# Patient Record
Sex: FEMALE | Race: WHITE | HISPANIC OR LATINO | URBAN - METROPOLITAN AREA
[De-identification: names, ages, dates, MRNs, and addresses within clinical notes are randomized per-mention and may not be internally consistent; named-entity substitution may affect disease eponyms.]

---

## 2019-11-16 ENCOUNTER — INPATIENT HOSPITAL (AMBULATORY)
Dept: URBAN - METROPOLITAN AREA HOSPITAL 76 | Facility: HOSPITAL | Age: 34
End: 2019-11-16

## 2019-11-16 DIAGNOSIS — R93.3 ABNORMAL FINDINGS ON DIAGNOSTIC IMAGING OF OTHER PARTS OF DI: ICD-10-CM

## 2019-11-16 DIAGNOSIS — K85.90 ACUTE PANCREATITIS WITHOUT NECROSIS OR INFECTION, UNSPECIFIE: ICD-10-CM

## 2019-11-16 PROCEDURE — 99252 IP/OBS CONSLTJ NEW/EST SF 35: CPT | Performed by: INTERNAL MEDICINE

## 2019-11-17 PROCEDURE — 99232 SBSQ HOSP IP/OBS MODERATE 35: CPT | Performed by: INTERNAL MEDICINE

## 2020-03-11 ENCOUNTER — TRANSCRIBE ORDERS (OUTPATIENT)
Dept: ADMINISTRATIVE | Facility: HOSPITAL | Age: 35
End: 2020-03-11

## 2020-03-11 DIAGNOSIS — N18.6 END STAGE RENAL DISEASE (HCC): Primary | ICD-10-CM

## 2020-04-08 ENCOUNTER — APPOINTMENT (OUTPATIENT)
Dept: CARDIOLOGY | Facility: HOSPITAL | Age: 35
End: 2020-04-08

## 2020-04-09 ENCOUNTER — APPOINTMENT (OUTPATIENT)
Dept: VASCULAR SURGERY | Facility: HOSPITAL | Age: 35
End: 2020-04-09

## 2020-04-14 ENCOUNTER — APPOINTMENT (OUTPATIENT)
Dept: CARDIOLOGY | Facility: HOSPITAL | Age: 35
End: 2020-04-14

## 2020-04-15 ENCOUNTER — HOSPITAL ENCOUNTER (OUTPATIENT)
Dept: CARDIOLOGY | Facility: HOSPITAL | Age: 35
Discharge: HOME | End: 2020-04-15
Admitting: SURGERY

## 2020-04-15 DIAGNOSIS — N18.6 END STAGE RENAL DISEASE (HCC): ICD-10-CM

## 2020-04-15 LAB
BH CV VAS MEAS BASILIC ANTECUBITAL FOSSA LEFT: 0.12 CM
BH CV VAS MEAS BASILIC ANTECUBITAL FOSSA RIGHT: 0.12 CM
BH CV VAS MEAS BASILIC FOREARM LEFT - DIST: 0.08 CM
BH CV VAS MEAS BASILIC FOREARM LEFT - MID: 0.11 CM
BH CV VAS MEAS BASILIC FOREARM RIGHT - DIST: 0.13 CM
BH CV VAS MEAS BASILIC FOREARM RIGHT - MID: 0.1 CM
BH CV VAS MEAS BASILIC FOREARM RIGHT - PROX: 0.14 CM
BH CV VAS MEAS BASILIC UPPER ARM LEFT - DIST: 0.12 CM
BH CV VAS MEAS BASILIC UPPER ARM LEFT - MID: 0.16 CM
BH CV VAS MEAS BASILIC UPPER ARM LEFT - PROX: 0.2 CM
BH CV VAS MEAS BASILIC UPPER ARM RIGHT - DIST: 0.22 CM
BH CV VAS MEAS BASILIC UPPER ARM RIGHT - MID: 0.23 CM
BH CV VAS MEAS BASILIC UPPER ARM RIGHT - PROX: 0.39 CM
BH CV VAS MEAS CEPHALIC ANTECUBITAL FOSSA LEFT: 0.37 CM
BH CV VAS MEAS CEPHALIC ANTECUBITAL FOSSA RIGHT: 0.45 CM
BH CV VAS MEAS CEPHALIC FOREARM LEFT - DIST: 0.09 CM
BH CV VAS MEAS CEPHALIC FOREARM LEFT - MID: 0.11 CM
BH CV VAS MEAS CEPHALIC FOREARM LEFT - PROX: 0.12 CM
BH CV VAS MEAS CEPHALIC FOREARM RIGHT - DIST: 0.09 CM
BH CV VAS MEAS CEPHALIC FOREARM RIGHT - MID: 0.15 CM
BH CV VAS MEAS CEPHALIC FOREARM RIGHT - PROX: 0.13 CM
BH CV VAS MEAS CEPHALIC UPPER ARM LEFT - DIST: 0.17 CM
BH CV VAS MEAS CEPHALIC UPPER ARM LEFT - MID: 0.21 CM
BH CV VAS MEAS CEPHALIC UPPER ARM LEFT - PROX: 0.17 CM
BH CV VAS MEAS CEPHALIC UPPER ARM RIGHT - DIST: 0.31 CM
BH CV VAS MEAS CEPHALIC UPPER ARM RIGHT - MID: 0.12 CM
BH CV VAS MEAS CEPHALIC UPPER ARM RIGHT - PROX: 0.17 CM
BH CV VAS MEAS RADIAL UPPER ARM LEFT - DIST: 0.15 CM
BH CV VAS MEAS RADIAL UPPER ARM LEFT - MID: 0.2 CM
BH CV VAS MEAS RADIAL UPPER ARM LEFT - PROX: 0.22 CM
BH CV VAS MEAS RADIAL UPPER ARM RIGHT - DIST: 0.19 CM
BH CV VAS MEAS RADIAL UPPER ARM RIGHT - MID: 0.18 CM
BH CV VAS MEAS RADIAL UPPER ARM RIGHT - PROX: 0.2 CM
UPPER ARTERIAL LEFT ARM BRACHIAL LENGTH: 0.31 CM
UPPER ARTERIAL RIGHT ARM BRACHIAL LENGTH: 0.38 CM

## 2020-04-15 PROCEDURE — 93986 DUP-SCAN HEMO COMPL UNI STD: CPT

## 2020-10-20 ENCOUNTER — TRANSCRIBE ORDERS (OUTPATIENT)
Dept: ADMINISTRATIVE | Facility: HOSPITAL | Age: 35
End: 2020-10-20

## 2020-10-20 DIAGNOSIS — N18.6 END STAGE RENAL DISEASE (HCC): Primary | ICD-10-CM

## 2020-10-21 ENCOUNTER — HOSPITAL ENCOUNTER (OUTPATIENT)
Dept: CARDIOLOGY | Facility: HOSPITAL | Age: 35
Discharge: HOME | End: 2020-10-21
Admitting: SURGERY

## 2020-10-21 DIAGNOSIS — N18.6 END STAGE RENAL DISEASE (HCC): ICD-10-CM

## 2020-10-21 LAB
BH CV VAS DIALYSIS ARTERIAL ANASTOMOSIS EDV: 136 CM/SEC
BH CV VAS DIALYSIS ARTERIAL ANASTOMOSIS PSV: 285 CM/SEC
BH CV VAS DIALYSIS CONDUIT DIST DEPTH: 1.61 CM
BH CV VAS DIALYSIS CONDUIT DIST DIAMETER: 0.96 CM
BH CV VAS DIALYSIS CONDUIT DIST EDV: 74 CM/SEC
BH CV VAS DIALYSIS CONDUIT DIST FLOW VOL: 1325 ML/MIN
BH CV VAS DIALYSIS CONDUIT DIST PSV: 140 CM/SEC
BH CV VAS DIALYSIS CONDUIT MID DEPTH: 1 CM
BH CV VAS DIALYSIS CONDUIT MID DIAMETER: 0.46 CM
BH CV VAS DIALYSIS CONDUIT MID EDV: 491 CM/SEC
BH CV VAS DIALYSIS CONDUIT MID FLOW VOL: 1537 ML/MIN
BH CV VAS DIALYSIS CONDUIT MID PSV: 802 CM/SEC
BH CV VAS DIALYSIS CONDUIT MID/DIST DEPTH: 1 CM
BH CV VAS DIALYSIS CONDUIT MID/DIST DIAMETER: 1.21 CM
BH CV VAS DIALYSIS CONDUIT MID/DIST EDV: 78 CM/SEC
BH CV VAS DIALYSIS CONDUIT MID/DIST FLOW VOL: 2065 ML/MIN
BH CV VAS DIALYSIS CONDUIT MID/DIST PSV: 146 CM/SEC
BH CV VAS DIALYSIS CONDUIT PROX DEPTH: 0.37 CM
BH CV VAS DIALYSIS CONDUIT PROX DIAMETER: 0.43 CM
BH CV VAS DIALYSIS CONDUIT PROX EDV: 296 CM/SEC
BH CV VAS DIALYSIS CONDUIT PROX FLOW VOL: 1085 ML/MIN
BH CV VAS DIALYSIS CONDUIT PROX PSV: 509 CM/SEC
BH CV VAS DIALYSIS CONDUIT PROX/MID DEPTH: 0.65 CM
BH CV VAS DIALYSIS CONDUIT PROX/MID DIAMETER: 0.69 CM
BH CV VAS DIALYSIS CONDUIT PROX/MID EDV: 102 CM/SEC
BH CV VAS DIALYSIS CONDUIT PROX/MID FLOW VOL: 915 ML/MIN
BH CV VAS DIALYSIS CONDUIT PROX/MID PSV: 207 CM/SEC
BH CV VAS DIALYSIS LEFT BRANCH 1 DIAMETER: 0.35 CM
BH CV VAS DIALYSIS PRE-INFLOW BRACHIAL DIAMETER: 0.67 CM
BH CV VAS DIALYSIS PRE-INFLOW BRACHIAL EDV: 64 CM/SEC
BH CV VAS DIALYSIS PRE-INFLOW BRACHIAL FLOW VOL: 842 ML/MIN
BH CV VAS DIALYSIS PRE-INFLOW BRACHIAL PSV: 119 CM/SEC
BH CV VAS DIALYSIS VENOUS OUTFLOW AXILLARY DIAMETER: 1.08 CM
BH CV VAS DIALYSIS VENOUS OUTFLOW AXILLARY EDV: 13 CM/SEC
BH CV VAS DIALYSIS VENOUS OUTFLOW AXILLARY PSV: 38 CM/SEC

## 2020-10-21 PROCEDURE — 93990 DOPPLER FLOW TESTING: CPT

## 2020-10-22 ENCOUNTER — APPOINTMENT (OUTPATIENT)
Dept: VASCULAR SURGERY | Facility: HOSPITAL | Age: 35
End: 2020-10-22

## 2020-10-22 PROCEDURE — G0463 HOSPITAL OUTPT CLINIC VISIT: HCPCS

## 2020-11-09 RX ORDER — CLONIDINE HYDROCHLORIDE 0.3 MG/1
0.3 TABLET ORAL 2 TIMES DAILY
COMMUNITY

## 2020-11-09 RX ORDER — LABETALOL 300 MG/1
300 TABLET, FILM COATED ORAL 2 TIMES DAILY
COMMUNITY

## 2020-11-09 RX ORDER — BUMETANIDE 2 MG/1
2 TABLET ORAL DAILY
COMMUNITY

## 2020-11-09 RX ORDER — FUROSEMIDE 20 MG/1
20 TABLET ORAL DAILY
COMMUNITY

## 2020-11-09 RX ORDER — MINOXIDIL 2.5 MG/1
2.5 TABLET ORAL 2 TIMES DAILY
COMMUNITY

## 2020-11-09 RX ORDER — INSULIN ASPART 100 [IU]/ML
17 INJECTION, SUSPENSION SUBCUTANEOUS 2 TIMES DAILY WITH MEALS
COMMUNITY

## 2020-11-09 RX ORDER — HYDRALAZINE HYDROCHLORIDE 100 MG/1
100 TABLET, FILM COATED ORAL 3 TIMES DAILY
COMMUNITY

## 2020-11-11 ENCOUNTER — HOSPITAL ENCOUNTER (OUTPATIENT)
Dept: CARDIOLOGY | Facility: HOSPITAL | Age: 35
Discharge: HOME | End: 2020-11-11

## 2020-11-11 ENCOUNTER — LAB (OUTPATIENT)
Dept: LAB | Facility: HOSPITAL | Age: 35
End: 2020-11-11

## 2020-11-11 PROCEDURE — U0004 COV-19 TEST NON-CDC HGH THRU: HCPCS

## 2020-11-11 PROCEDURE — 93005 ELECTROCARDIOGRAM TRACING: CPT | Performed by: SURGERY

## 2020-11-11 PROCEDURE — 93010 ELECTROCARDIOGRAM REPORT: CPT | Performed by: INTERNAL MEDICINE

## 2020-11-11 PROCEDURE — C9803 HOPD COVID-19 SPEC COLLECT: HCPCS

## 2020-11-12 ENCOUNTER — ANESTHESIA EVENT (OUTPATIENT)
Dept: PERIOP | Facility: HOSPITAL | Age: 35
End: 2020-11-12

## 2020-11-12 LAB — SARS-COV-2 RNA RESP QL NAA+PROBE: NOT DETECTED

## 2020-11-13 ENCOUNTER — HOSPITAL ENCOUNTER (OUTPATIENT)
Facility: HOSPITAL | Age: 35
Setting detail: HOSPITAL OUTPATIENT SURGERY
Discharge: HOME | End: 2020-11-13
Attending: SURGERY | Admitting: SURGERY

## 2020-11-13 ENCOUNTER — ANESTHESIA (OUTPATIENT)
Dept: PERIOP | Facility: HOSPITAL | Age: 35
End: 2020-11-13

## 2020-11-13 VITALS
OXYGEN SATURATION: 97 % | SYSTOLIC BLOOD PRESSURE: 168 MMHG | TEMPERATURE: 98.2 F | WEIGHT: 143.21 LBS | DIASTOLIC BLOOD PRESSURE: 58 MMHG | HEART RATE: 65 BPM | HEIGHT: 61 IN | BODY MASS INDEX: 27.04 KG/M2 | RESPIRATION RATE: 16 BRPM

## 2020-11-13 DIAGNOSIS — N18.6 ESRD (END STAGE RENAL DISEASE) ON DIALYSIS (HCC): Primary | ICD-10-CM

## 2020-11-13 DIAGNOSIS — Z99.2 ESRD (END STAGE RENAL DISEASE) ON DIALYSIS (HCC): Primary | ICD-10-CM

## 2020-11-13 LAB
ANION GAP SERPL CALCULATED.3IONS-SCNC: 13 MMOL/L (ref 5–15)
APTT PPP: 27 SECONDS (ref 24–31)
B-HCG UR QL: NEGATIVE
BUN SERPL-MCNC: 40 MG/DL (ref 6–20)
BUN/CREAT SERPL: 9.2 (ref 7–25)
CALCIUM SPEC-SCNC: 8.3 MG/DL (ref 8.6–10.5)
CHLORIDE SERPL-SCNC: 97 MMOL/L (ref 98–107)
CO2 SERPL-SCNC: 27 MMOL/L (ref 22–29)
CREAT SERPL-MCNC: 4.33 MG/DL (ref 0.57–1)
DEPRECATED RDW RBC AUTO: 47.3 FL (ref 37–54)
ERYTHROCYTE [DISTWIDTH] IN BLOOD BY AUTOMATED COUNT: 14.3 % (ref 12.3–15.4)
GFR SERPL CREATININE-BSD FRML MDRD: 12 ML/MIN/1.73
GFR SERPL CREATININE-BSD FRML MDRD: 14 ML/MIN/1.73
GLUCOSE BLDC GLUCOMTR-MCNC: 176 MG/DL (ref 70–105)
GLUCOSE SERPL-MCNC: 199 MG/DL (ref 65–99)
HCT VFR BLD AUTO: 38.2 % (ref 34–46.6)
HGB BLD-MCNC: 12.6 G/DL (ref 12–15.9)
INR PPP: 1.06 (ref 0.93–1.1)
MCH RBC QN AUTO: 30.8 PG (ref 26.6–33)
MCHC RBC AUTO-ENTMCNC: 32.9 G/DL (ref 31.5–35.7)
MCV RBC AUTO: 93.5 FL (ref 79–97)
PLATELET # BLD AUTO: 187 10*3/MM3 (ref 140–450)
PMV BLD AUTO: 7.5 FL (ref 6–12)
POTASSIUM SERPL-SCNC: 4 MMOL/L (ref 3.5–5.2)
PROTHROMBIN TIME: 11.6 SECONDS (ref 9.6–11.7)
RBC # BLD AUTO: 4.08 10*6/MM3 (ref 3.77–5.28)
SODIUM SERPL-SCNC: 137 MMOL/L (ref 136–145)
WBC # BLD AUTO: 5 10*3/MM3 (ref 3.4–10.8)

## 2020-11-13 PROCEDURE — 80048 BASIC METABOLIC PNL TOTAL CA: CPT | Performed by: SURGERY

## 2020-11-13 PROCEDURE — 25010000002 HEPARIN (PORCINE) PER 1000 UNITS: Performed by: NURSE ANESTHETIST, CERTIFIED REGISTERED

## 2020-11-13 PROCEDURE — 82962 GLUCOSE BLOOD TEST: CPT

## 2020-11-13 PROCEDURE — 25010000002 HEPARIN (PORCINE) PER 1000 UNITS: Performed by: SURGERY

## 2020-11-13 PROCEDURE — 76942 ECHO GUIDE FOR BIOPSY: CPT | Performed by: SURGERY

## 2020-11-13 PROCEDURE — 25010000003 LIDOCAINE 1 % SOLUTION 20 ML VIAL: Performed by: SURGERY

## 2020-11-13 PROCEDURE — 85610 PROTHROMBIN TIME: CPT | Performed by: SURGERY

## 2020-11-13 PROCEDURE — 85027 COMPLETE CBC AUTOMATED: CPT | Performed by: SURGERY

## 2020-11-13 PROCEDURE — 25010000002 PROPOFOL 10 MG/ML EMULSION: Performed by: NURSE ANESTHETIST, CERTIFIED REGISTERED

## 2020-11-13 PROCEDURE — C1725 CATH, TRANSLUMIN NON-LASER: HCPCS | Performed by: SURGERY

## 2020-11-13 PROCEDURE — 25010000002 PROTAMINE SULFATE PER 10 MG: Performed by: NURSE ANESTHETIST, CERTIFIED REGISTERED

## 2020-11-13 PROCEDURE — 25010000002 MEPIVACAINE PF 1 % SOLUTION: Performed by: ANESTHESIOLOGY

## 2020-11-13 PROCEDURE — 25010000003 CEFAZOLIN PER 500 MG: Performed by: SURGERY

## 2020-11-13 PROCEDURE — 25010000002 MIDAZOLAM PER 1 MG: Performed by: NURSE ANESTHETIST, CERTIFIED REGISTERED

## 2020-11-13 PROCEDURE — 25010000002 HYDRALAZINE PER 20 MG: Performed by: NURSE ANESTHETIST, CERTIFIED REGISTERED

## 2020-11-13 PROCEDURE — 81025 URINE PREGNANCY TEST: CPT | Performed by: SURGERY

## 2020-11-13 PROCEDURE — 25010000002 FENTANYL CITRATE (PF) 100 MCG/2ML SOLUTION: Performed by: NURSE ANESTHETIST, CERTIFIED REGISTERED

## 2020-11-13 PROCEDURE — 25010000002 ROPIVACAINE PER 1 MG: Performed by: ANESTHESIOLOGY

## 2020-11-13 PROCEDURE — 85730 THROMBOPLASTIN TIME PARTIAL: CPT | Performed by: SURGERY

## 2020-11-13 DEVICE — LIGACLIP MCA MULTIPLE CLIP APPLIERS, 20 SMALL CLIPS
Type: IMPLANTABLE DEVICE | Site: ARM | Status: FUNCTIONAL
Brand: LIGACLIP

## 2020-11-13 DEVICE — SEAL HEMO SURG ARISTA/AH ABS/PWDR 3GM: Type: IMPLANTABLE DEVICE | Site: ARM | Status: FUNCTIONAL

## 2020-11-13 DEVICE — FLOSEAL HEMOSTATIC MATRIX, 5ML
Type: IMPLANTABLE DEVICE | Site: ARM | Status: FUNCTIONAL
Brand: FLOSEAL HEMOSTATIC MATRIX

## 2020-11-13 RX ORDER — FENTANYL CITRATE 50 UG/ML
INJECTION, SOLUTION INTRAMUSCULAR; INTRAVENOUS AS NEEDED
Status: DISCONTINUED | OUTPATIENT
Start: 2020-11-13 | End: 2020-11-13 | Stop reason: SURG

## 2020-11-13 RX ORDER — HYDRALAZINE HYDROCHLORIDE 20 MG/ML
INJECTION INTRAMUSCULAR; INTRAVENOUS AS NEEDED
Status: DISCONTINUED | OUTPATIENT
Start: 2020-11-13 | End: 2020-11-13 | Stop reason: SURG

## 2020-11-13 RX ORDER — PROTAMINE SULFATE 10 MG/ML
INJECTION, SOLUTION INTRAVENOUS AS NEEDED
Status: DISCONTINUED | OUTPATIENT
Start: 2020-11-13 | End: 2020-11-13 | Stop reason: SURG

## 2020-11-13 RX ORDER — HEPARIN SODIUM 1000 [USP'U]/ML
INJECTION, SOLUTION INTRAVENOUS; SUBCUTANEOUS AS NEEDED
Status: DISCONTINUED | OUTPATIENT
Start: 2020-11-13 | End: 2020-11-13 | Stop reason: SURG

## 2020-11-13 RX ORDER — ROPIVACAINE HYDROCHLORIDE 5 MG/ML
INJECTION, SOLUTION EPIDURAL; INFILTRATION; PERINEURAL
Status: COMPLETED | OUTPATIENT
Start: 2020-11-13 | End: 2020-11-13

## 2020-11-13 RX ORDER — ONDANSETRON 2 MG/ML
4 INJECTION INTRAMUSCULAR; INTRAVENOUS ONCE AS NEEDED
Status: DISCONTINUED | OUTPATIENT
Start: 2020-11-13 | End: 2020-11-13 | Stop reason: HOSPADM

## 2020-11-13 RX ORDER — SODIUM CHLORIDE 9 MG/ML
20 INJECTION, SOLUTION INTRAVENOUS CONTINUOUS
Status: DISCONTINUED | OUTPATIENT
Start: 2020-11-13 | End: 2020-11-13 | Stop reason: HOSPADM

## 2020-11-13 RX ORDER — NALOXONE HCL 0.4 MG/ML
0.4 VIAL (ML) INJECTION AS NEEDED
Status: DISCONTINUED | OUTPATIENT
Start: 2020-11-13 | End: 2020-11-13 | Stop reason: HOSPADM

## 2020-11-13 RX ORDER — LABETALOL HYDROCHLORIDE 5 MG/ML
5 INJECTION, SOLUTION INTRAVENOUS
Status: DISCONTINUED | OUTPATIENT
Start: 2020-11-13 | End: 2020-11-13 | Stop reason: HOSPADM

## 2020-11-13 RX ORDER — HYDROMORPHONE HCL 110MG/55ML
0.2 PATIENT CONTROLLED ANALGESIA SYRINGE INTRAVENOUS
Status: DISCONTINUED | OUTPATIENT
Start: 2020-11-13 | End: 2020-11-13 | Stop reason: HOSPADM

## 2020-11-13 RX ORDER — MIDAZOLAM HYDROCHLORIDE 1 MG/ML
INJECTION INTRAMUSCULAR; INTRAVENOUS AS NEEDED
Status: DISCONTINUED | OUTPATIENT
Start: 2020-11-13 | End: 2020-11-13 | Stop reason: SURG

## 2020-11-13 RX ORDER — HYDROMORPHONE HCL 110MG/55ML
0.5 PATIENT CONTROLLED ANALGESIA SYRINGE INTRAVENOUS
Status: DISCONTINUED | OUTPATIENT
Start: 2020-11-13 | End: 2020-11-13 | Stop reason: HOSPADM

## 2020-11-13 RX ORDER — HYDROCODONE BITARTRATE AND ACETAMINOPHEN 5; 325 MG/1; MG/1
1 TABLET ORAL EVERY 8 HOURS PRN
Qty: 20 TABLET | Refills: 0 | Status: SHIPPED | OUTPATIENT
Start: 2020-11-13

## 2020-11-13 RX ADMIN — FENTANYL CITRATE 25 MCG: 50 INJECTION, SOLUTION INTRAMUSCULAR; INTRAVENOUS at 08:05

## 2020-11-13 RX ADMIN — MEPIVACAINE HYDROCHLORIDE 15 ML: 10 INJECTION, SOLUTION EPIDURAL; INFILTRATION at 07:16

## 2020-11-13 RX ADMIN — CEFAZOLIN SODIUM 2 G: 1 INJECTION, POWDER, FOR SOLUTION INTRAMUSCULAR; INTRAVENOUS at 07:52

## 2020-11-13 RX ADMIN — HYDRALAZINE HYDROCHLORIDE 5 MG: 20 INJECTION INTRAMUSCULAR; INTRAVENOUS at 08:10

## 2020-11-13 RX ADMIN — ROPIVACAINE HYDROCHLORIDE 15 ML: 5 INJECTION, SOLUTION EPIDURAL; INFILTRATION; PERINEURAL at 07:16

## 2020-11-13 RX ADMIN — PROPOFOL 125 MCG/KG/MIN: 10 INJECTION, EMULSION INTRAVENOUS at 07:49

## 2020-11-13 RX ADMIN — SODIUM CHLORIDE 20 ML/HR: 9 INJECTION, SOLUTION INTRAVENOUS at 06:36

## 2020-11-13 RX ADMIN — PROTAMINE SULFATE 40 MG: 10 INJECTION, SOLUTION INTRAVENOUS at 09:08

## 2020-11-13 RX ADMIN — MIDAZOLAM 2 MG: 1 INJECTION INTRAMUSCULAR; INTRAVENOUS at 07:49

## 2020-11-13 RX ADMIN — HEPARIN SODIUM 7500 UNITS: 1000 INJECTION, SOLUTION INTRAVENOUS; SUBCUTANEOUS at 08:42

## 2020-11-13 NOTE — ANESTHESIA PREPROCEDURE EVALUATION
Anesthesia Evaluation     Patient summary reviewed and Nursing notes reviewed   no history of anesthetic complications:  NPO Solid Status: > 8 hours  NPO Liquid Status: > 8 hours           Airway   Dental      Pulmonary    Cardiovascular     ECG reviewed  Patient on routine beta blocker    (+) hypertension,       Neuro/Psych  GI/Hepatic/Renal/Endo    (+)   renal disease ESRD and dialysis, diabetes mellitus,     Musculoskeletal     Abdominal    Substance History      OB/GYN          Other        ROS/Med Hx Other:     PSH  ARTERIOVENOUS FISTULA                  Anesthesia Plan    ASA 4     regional   (Patient identified; pre-operative vital signs, all relevant labs/studies, complete medical/surgical/anesthetic history, full medication list, full allergy list, and NPO status obtained/reviewed; physical assessment performed; anesthetic options, side effects, potential complications, risks, and benefits discussed; questions answered; written anesthesia consent obtained; patient cleared for procedure; anesthesia machine and equipment checked and functioning)    Anesthetic plan, all risks, benefits, and alternatives have been provided, discussed and informed consent has been obtained with: patient.    Plan discussed with CRNA and CAA.

## 2020-11-13 NOTE — ANESTHESIA POSTPROCEDURE EVALUATION
Patient: Geno Crouch    Procedure Summary     Date: 11/13/20 Room / Location: [facility] OR 10 / [facility] MAIN OR    Anesthesia Start: 0746 Anesthesia Stop: 0939    Procedure: LEFT ARM FISTULA TRANSPOSITION (Left Arm Upper) Diagnosis:       End stage renal disease (CMS/HCC)      (End stage renal disease (CMS/HCC) [N18.6])    Surgeon: Arnel Douglas MD Provider: Efraín Gutiérrez MD    Anesthesia Type: regional ASA Status: 4          Anesthesia Type: regional    Vitals  Vitals Value Taken Time   /72 11/13/20 0958   Temp     Pulse 63 11/13/20 1001   Resp 11 11/13/20 0956   SpO2 100 % 11/13/20 1001   Vitals shown include unvalidated device data.        Post Anesthesia Care and Evaluation    Patient location during evaluation: PACU  Patient participation: complete - patient participated  Level of consciousness: awake  Pain scale: See nurse's notes for pain score.  Pain management: adequate  Airway patency: patent  Anesthetic complications: No anesthetic complications  PONV Status: none  Cardiovascular status: acceptable  Respiratory status: acceptable  Hydration status: acceptable    Comments: Patient seen and examined postoperatively; vital signs stable; SpO2 greater than or equal to 90%; cardiopulmonary status stable; nausea/vomiting adequately controlled; pain adequately controlled; no apparent anesthesia complications; patient discharged from anesthesia care when discharge criteria were met

## 2020-11-13 NOTE — DISCHARGE INSTRUCTIONS
[facility]  Santosh Mallory, Gene Suarez Rachel, [place]  [address], [address]  [phone]    Post-Operative Instructions for AV Fistula / Graft   Diet: Regular Diet    Medications: Take your regularly scheduled medications on the day of your surgery, unless your doctor has directed you otherwise. You may be sent home with a prescription for pain medication, follow the directions as prescribed.    Activity Restrictions / Driving: Avoid lifting more than 15 pounds or other activities that stress or compress the access area. No driving for the remainder of the day after surgery. You may drive when you no longer are taking narcotic pain medications. If a nerve block was done to numb your arm for surgery, you will be placed in an arm sling.  This numbness and inability to move the arm can last for as little as 6 hours but as many as 18.  The sling should be used during this time but can be removed when sensation and movement of your arm is normal and does not need to be used after that. Use of the arm is encouraged after the surgery.    Incision Care: Some bruising is normal. If you have drainage from the incision please notify the office. Dressing should be removed in 48 hours. After dressing is removed, it is OK to shower. Do not submerge incision until cleared by your surgeon (bath or swimming).    Bathing and Showering: You may shower after you remove your dressing.    Follow-up Appointments: You will need to return to the office for a follow-up visit within 1-3 weeks after your surgery. Please make sure you have your appointment scheduled, call [phone].    The patient (you) should:  1. Avoid wearing tight constrictive clothing over that arm.  2. Avoid wearing jewelry that is tight, such as a watch on the access arm.  3. Avoid carrying heavy objects.  4. Avoid purse straps over the fistula.  5. Avoid sleeping on the arm or keeping it bent for extended periods of time.  6.  "Each day, using your opposite hand, feel over the fistula for the \"thrill\" or vibration that is normally present.    Fistula Information / Care:  ·  It is normal to have swelling in the surgical area. To help control this swelling, you should elevate your arm on a pillow.  ·  Wiggle your fingers and clinch your fist 10 times every hour, while awake, for the first 5-7 days. Also, bend and straighten at the elbow to regain normal range of motion. These exercises are designed to promote circulation in the fingers and aid in draining away the excess fluid accumulation in the immediate area.  · No blood pressures or needle sticks in the arm with your access.    Call the office for the followin. Fever greater than 101.0  2. Uncontrolled pain. This is on a scale of 1-10 (10 being the worst pain imaginable) your pain is a level 7 or above.  3. It is important that you notify our office if you are having numbness and significant pain in the extremity in which you have just had surgery!  4. Decreased or absent thrill.  5. Nausea, diarrhea, and/or vomiting that continue for 12-24 hours.  6. Signs of an infection: redness, increased swelling, drainage, fever and/or chills.  7. Chest pain or difficulty breathing.    The fistula or graft CAN NOT be used until the MD has given written approval. Generally, a graft will be ready to use in 2 weeks, and a fistula will be ready to use in 6-8 weeks.     If you have further questions after reading this handout, the office is open from 8:30am to 5:00pm Monday through Friday. Call [phone].    "

## 2020-11-13 NOTE — ANESTHESIA PROCEDURE NOTES
Peripheral Block    Pre-sedation assessment completed: 11/13/2020 7:10 AM    Patient reassessed immediately prior to procedure    Patient location during procedure: pre-op  Start time: 11/13/2020 7:10 AM  Stop time: 11/13/2020 7:17 AM  Reason for block: at surgeon's request, post-op pain management and primary anesthetic  Performed by  Anesthesiologist: Sean Issa DO  Preanesthetic Checklist  Completed: patient identified, site marked, surgical consent, pre-op evaluation, timeout performed, IV checked, risks and benefits discussed and monitors and equipment checked  Prep:  Pt Position: supine  Sterile barriers:washed/disinfected hands, cap, gloves and mask  Prep: ChloraPrep  Patient monitoring: blood pressure monitoring, continuous pulse oximetry and EKG  Procedure  Sedation:no    Guidance:ultrasound guided  ULTRASOUND INTERPRETATION. Using ultrasound guidance a 22 G gauge needle was placed in close proximity to the nerve, at which point, under ultrasound guidance anesthetic was injected in the area of the nerve and spread of the anesthesia was seen on ultrasound in close proximity thereto.  There were no abnormalities seen on ultrasound; a digital image was taken; and the patient tolerated the procedure with no complications. Images:still images obtained, printed/placed on chart    Laterality:left  Block Type:supraclavicular  Injection Technique:single-shot  Needle Type:echogenic  Needle Gauge:22 G  Resistance on Injection: less than 15 psi    Medications Used: ropivacaine (NAROPIN) 0.5 % injection, 15 mL  mepivacaine PF (CARBOCAINE) 1 % injection, 15 mL  Med admintered at 11/13/2020 7:16 AM      Post Assessment  Injection Assessment: negative aspiration for heme, no paresthesia on injection and incremental injection  Patient Tolerance:comfortable throughout block  Complications:no

## 2020-11-13 NOTE — OP NOTE
Date of Admission:  11/13/2020  Today's Date:  11/13/20  Arnel Douglas MD  [facility]      Preoperative Diagnosis: End Stage Renal Disease    Postoperative Diagnosis: same as above    Procedure Performed: Transposition of previous created basilic vein fistula with new anastamosis    CPT:   19541    Surgeon: Arnel Douglas MD    Assistant:  Brigette RICHARDS , Provided critical assistance in exposure, retraction, and suction that overall decrease blood loss and operative time.    Anesthesia: MAC with regional    Staff:   Circulator: Shelbie Whitehead RN; Karolina Wilks RN  Scrub Person: Darlin Lockhart  Assistant: Brigette Montanez CSA    Estimated Blood Loss: minimal    Findings:     Vein quality:  Moderate   Artery quality:  Good   Post operative thrill quality:  Good   Post distal perfusion: Triphasic radial signal.    There was a mildly sclerotic short segment of the mid basilic vein that was able to be distended with manual hydraulic pressure.  Ultimately I did use a 7 mm balloon to ensure this was maximally dilated.  Otherwise standard transposed basilic vein fistula creation.    Implants:    Implant Name Type Inv. Item Serial No.  Lot No. LRB No. Used Action   CLIPAPPLR M/ ENDO LIGACLIP 9 3/8IN SM - [id number] Implant CLIPAPPLR M/ ENDO LIGACLIP 9 3/8IN   [employer]  DIV [employer] J [employer] J . Left 1 Implanted   SEAL HEMO ABS NIKKIE AH PWDR 3GM - [id number] Implant SEAL HEMO ABS NIKKIE AH PWDR 3GM  MED[employer] HEMOSTAT[employer] [id number] Left 1 Implanted   KT SEAL HEMOS ABS FLOSEAL MATRX FAST/PREP 5ML - [id number] Implant KT SEAL HEMOS ABS FLOSEAL MATRX FAST/PREP 5ML  [employer] [id number] Left 1 Implanted       Specimen: none    Complications: none    Dispo: to PACU    Indication for procedure: 34 y.o. female with renal failure status post first stage basilic vein fistula creation [place].  She comes in today for second stage basilic vein fistula creation.   I discussed with her the risk, benefits, and alternatives informed consent was obtained.    Description of procedure:   The patient was taken to the operating room and placed in the supine position.  The arm was extended on an arm board and then prepped and draped in the usual sterile fashion.  Preoperative antibiotics were given.  A full surgical timeout was done.                 Longitudinal incision was made overlying the basilic vein fistula.  Meticulous dissection was performed throughout the entirety of the fistula from its arterial anastomosis to its insertion into the axillary vein.  The fistula was circumferentially dissected and all branches ligated throughout its course.  Care was taken to identify and preserve and protect all branches of the median antebrachial cutaneous nerve.      Brachial artery was dissected just proximal to the antecubital fossa with a circumferential control.  Lateral tunnel was created with tunneling device.  Heparin was administered.  5 minutes was allowed to elapse.  Vein was marked.  Distal vein was transected and the fistula was flushed with heparin-containing solution.  The vein was pulled through tunnel.  Arterial clamps were placed on the brachial artery arteriotomy was made with 11 blade scalpel and extended with Sultana scissors vein was spatulated and a anastomosis was performed with a 6-0 Prolene stitch in a running manner.     Meticulous hemostasis was obtained.  Distal perfusion the hand was inspected and appeared to be adequate.  Small dose of protamine was given to reverse heparin.  Deep tissues were all closed using a 2-0 Vicryl suture and superficial subcutaneous tissue was closed using 3-0 Vicryl suture skin was closed using a 4-0 Vicryl suture in a septic manner.  Local anesthesia was injected throughout the surgical field.  Patient tolerated the procedure well and was taken to recovery room in stable condition.      Arnel Douglas MD  11/13/20    There are no  hospital problems to display for this patient.

## 2020-11-14 ENCOUNTER — HOSPITAL ENCOUNTER (EMERGENCY)
Facility: HOSPITAL | Age: 35
Discharge: HOME | End: 2020-11-15
Admitting: EMERGENCY MEDICINE

## 2020-11-14 DIAGNOSIS — L24.89 IRRITANT CONTACT DERMATITIS DUE TO OTHER AGENTS: Primary | ICD-10-CM

## 2020-11-14 PROCEDURE — 99283 EMERGENCY DEPT VISIT LOW MDM: CPT

## 2020-11-15 VITALS
RESPIRATION RATE: 20 BRPM | BODY MASS INDEX: 27.1 KG/M2 | SYSTOLIC BLOOD PRESSURE: 138 MMHG | HEIGHT: 62 IN | DIASTOLIC BLOOD PRESSURE: 74 MMHG | WEIGHT: 147.27 LBS | OXYGEN SATURATION: 99 % | HEART RATE: 71 BPM | TEMPERATURE: 97.9 F

## 2020-11-15 RX ORDER — DIAPER,BRIEF,INFANT-TODD,DISP
EACH MISCELLANEOUS EVERY 12 HOURS SCHEDULED
Status: DISCONTINUED | OUTPATIENT
Start: 2020-11-15 | End: 2020-11-15 | Stop reason: HOSPADM

## 2020-11-15 RX ADMIN — HYDROCORTISONE: 1 CREAM TOPICAL at 00:38

## 2020-11-15 NOTE — ED PROVIDER NOTES
Subjective   Patient is a 34-year-old female who presents with complaints of allergic reaction to adhesive bandage that was placed yesterday.  Patient states she did have Transposition of previous created basilic vein fistula with new anastamosis done by Dr Douglas due to end-stage renal disease.  Patient currently rates her pain a 5/10 severity describes as a burning type pain.  She denies any paresthesias numbness weakness of her upper extremity.  Patient states the pain did go up her arm on the left side of her breast earlier denies any currently.  Patient is a shortness of breath, cough, rhinorrhea nasal congestion, fever.       used: Yes        Review of Systems   Constitutional: Negative.    HENT: Negative.    Eyes: Negative for photophobia and visual disturbance.   Respiratory: Negative.    Gastrointestinal: Negative for abdominal distention, abdominal pain, constipation, diarrhea, nausea and vomiting.   Musculoskeletal: Negative for back pain, neck pain and neck stiffness.   Skin: Positive for rash and wound.   Neurological: Negative.        Past Medical History:   Diagnosis Date   • CKD (chronic kidney disease)    • Diabetes mellitus (CMS/[facility])    • Dialysis patient (CMS/[facility])    • Hypertension        No Known Allergies    Past Surgical History:   Procedure Laterality Date   • ARTERIOVENOUS FISTULA         No family history on file.    Social History     Socioeconomic History   • Marital status: Single     Spouse name: Not on file   • Number of children: Not on file   • Years of education: Not on file   • Highest education level: Not on file   Tobacco Use   • Smoking status: Never Smoker   Substance and Sexual Activity   • Alcohol use: Not Currently   • Drug use: Not Currently           Objective   Physical Exam  Vitals signs and nursing note reviewed.   Constitutional:       General: She is not in acute distress.     Appearance: She is well-developed. She is not ill-appearing,  "toxic-appearing or diaphoretic.   HENT:      Head: Normocephalic and atraumatic.      Mouth/Throat:      Mouth: Mucous membranes are moist.      Pharynx: Oropharynx is clear.   Eyes:      General: No scleral icterus.     Extraocular Movements: Extraocular movements intact.      Pupils: Pupils are equal, round, and reactive to light.   Neck:      Musculoskeletal: Normal range of motion and neck supple. No muscular tenderness.   Cardiovascular:      Rate and Rhythm: Normal rate and regular rhythm.      Pulses: Normal pulses.      Heart sounds: No murmur. No friction rub. No gallop.    Pulmonary:      Effort: Pulmonary effort is normal. No respiratory distress.      Breath sounds: Normal breath sounds. No stridor. No wheezing, rhonchi or rales.   Chest:      Chest wall: No tenderness.   Musculoskeletal:        Arms:    Skin:     General: Skin is warm.      Capillary Refill: Capillary refill takes less than 2 seconds.      Coloration: Skin is not cyanotic, jaundiced or pale.      Findings: No rash.             Comments: Palpable thrill at anastomosis adequate bruit on auscultation   Neurological:      General: No focal deficit present.      Mental Status: She is alert and oriented to person, place, and time.   Psychiatric:         Mood and Affect: Mood normal.         Behavior: Behavior normal.         Procedures           ED Course    /74 (BP Location: Right arm, Patient Position: Sitting)   Pulse 71   Temp 98 °F (36.7 °C) (Oral)   Resp 16   Ht 157.5 cm (62\")   Wt 66.8 kg (147 lb 4.3 oz)   LMP 10/17/2020 (Approximate)   SpO2 99%   Breastfeeding No   BMI 26.94 kg/m²   Medications   hydrocortisone 1 % cream ( Topical Given 11/15/20 0038)     Labs Reviewed - No data to display  No radiology results for the last day                                         MDM  Number of Diagnoses or Management Options  Allergic dermatitis:   Diagnosis management comments: Chart Review:  Comorbidity: CKD on dialysis diabetes " hypertension  Disposition/Treatment:  Appropriate PPE was worn during exam and throughout all encounters with the patient.  While in the ED patient was found to have allergic reaction to adhesive bandage from recent AV fistula surgery done yesterday.  The incision line showed no signs of infection there is no cellulitic changes of the arm.  Once the bandage was removed area was cleansed hydrocortisone was applied along with a nonadherent dressing patient was advised to follow-up with surgeon on Monday for further evaluation and wound recheck.  She voiced understanding discharge instructions along with signs and symptoms to return the ED.      Final diagnoses:   Irritant contact dermatitis due to other agents            Kendra Amaya PA  11/15/20 0044

## 2020-11-15 NOTE — DISCHARGE INSTRUCTIONS
You may apply hydrocortisone cream 1-2 times daily for the next 2 days.  Do not apply over staples.    Look for signs of infection including increased redness, swelling, purulent drainage, or fever.    Follow-up with your surgeon on Monday for wound recheck.    Follow-up with your primary care provider in 3-5 days.  If you do not have a primary care provider call [phone] for help in finding one, or you may follow up with [facility] at [phone].

## 2020-11-17 LAB — QT INTERVAL: 444 MS

## 2020-11-18 ENCOUNTER — APPOINTMENT (OUTPATIENT)
Dept: VASCULAR SURGERY | Facility: HOSPITAL | Age: 35
End: 2020-11-18

## 2020-11-18 PROCEDURE — G0463 HOSPITAL OUTPT CLINIC VISIT: HCPCS

## 2020-11-25 ENCOUNTER — APPOINTMENT (OUTPATIENT)
Dept: VASCULAR SURGERY | Facility: HOSPITAL | Age: 35
End: 2020-11-25

## 2020-11-25 PROCEDURE — G0463 HOSPITAL OUTPT CLINIC VISIT: HCPCS

## 2020-12-09 ENCOUNTER — OFFICE (AMBULATORY)
Dept: URBAN - METROPOLITAN AREA CLINIC 64 | Facility: CLINIC | Age: 35
End: 2020-12-09
Payer: MEDICAID

## 2020-12-09 VITALS — WEIGHT: 147 LBS | SYSTOLIC BLOOD PRESSURE: 179 MMHG | DIASTOLIC BLOOD PRESSURE: 102 MMHG | HEART RATE: 70 BPM

## 2020-12-09 DIAGNOSIS — K59.00 CONSTIPATION, UNSPECIFIED: ICD-10-CM

## 2020-12-09 DIAGNOSIS — K21.9 GASTRO-ESOPHAGEAL REFLUX DISEASE WITHOUT ESOPHAGITIS: ICD-10-CM

## 2020-12-09 DIAGNOSIS — R10.13 EPIGASTRIC PAIN: ICD-10-CM

## 2020-12-09 DIAGNOSIS — R11.0 NAUSEA: ICD-10-CM

## 2020-12-09 PROCEDURE — 99203 OFFICE O/P NEW LOW 30 MIN: CPT | Performed by: NURSE PRACTITIONER

## 2020-12-09 RX ORDER — PANTOPRAZOLE SODIUM 40 MG/1
40 TABLET, DELAYED RELEASE ORAL
Qty: 30 | Refills: 11 | Status: COMPLETED
Start: 2020-12-09 | End: 2023-09-05

## 2020-12-09 RX ORDER — ONDANSETRON HYDROCHLORIDE 4 MG/1
16 TABLET, FILM COATED ORAL
Qty: 40 | Refills: 6 | Status: COMPLETED
Start: 2020-12-09 | End: 2023-09-05

## 2020-12-16 ENCOUNTER — TRANSCRIBE ORDERS (OUTPATIENT)
Dept: ADMINISTRATIVE | Facility: HOSPITAL | Age: 35
End: 2020-12-16

## 2020-12-16 ENCOUNTER — APPOINTMENT (OUTPATIENT)
Dept: VASCULAR SURGERY | Facility: HOSPITAL | Age: 35
End: 2020-12-16

## 2020-12-16 DIAGNOSIS — N18.6 END STAGE RENAL DISEASE (HCC): ICD-10-CM

## 2020-12-16 DIAGNOSIS — Z99.2 ENCOUNTER FOR EXTRACORPOREAL DIALYSIS (HCC): Primary | ICD-10-CM

## 2020-12-16 PROCEDURE — G0463 HOSPITAL OUTPT CLINIC VISIT: HCPCS

## 2020-12-30 ENCOUNTER — HOSPITAL ENCOUNTER (OUTPATIENT)
Dept: CARDIOLOGY | Facility: HOSPITAL | Age: 35
Discharge: HOME | End: 2020-12-30
Admitting: PHYSICIAN ASSISTANT

## 2020-12-30 DIAGNOSIS — Z99.2 ENCOUNTER FOR EXTRACORPOREAL DIALYSIS (HCC): ICD-10-CM

## 2020-12-30 DIAGNOSIS — N18.6 END STAGE RENAL DISEASE (HCC): ICD-10-CM

## 2020-12-30 LAB
BH CV VAS DIALYSIS ARTERIAL ANASTOMOSIS EDV: 187 CM/SEC
BH CV VAS DIALYSIS ARTERIAL ANASTOMOSIS PSV: 352 CM/SEC
BH CV VAS DIALYSIS CONDUIT DIST DEPTH: 0.17 CM
BH CV VAS DIALYSIS CONDUIT DIST DIAMETER: 0.89 CM
BH CV VAS DIALYSIS CONDUIT DIST EDV: 266 CM/SEC
BH CV VAS DIALYSIS CONDUIT DIST FLOW VOL: 3839 ML/MIN
BH CV VAS DIALYSIS CONDUIT DIST PSV: 437 CM/SEC
BH CV VAS DIALYSIS CONDUIT MID DEPTH: 0.24 CM
BH CV VAS DIALYSIS CONDUIT MID DIAMETER: 0.93 CM
BH CV VAS DIALYSIS CONDUIT MID EDV: 141 CM/SEC
BH CV VAS DIALYSIS CONDUIT MID FLOW VOL: 2954 ML/MIN
BH CV VAS DIALYSIS CONDUIT MID PSV: 260 CM/SEC
BH CV VAS DIALYSIS CONDUIT PROX DEPTH: 0.17 CM
BH CV VAS DIALYSIS CONDUIT PROX DIAMETER: 0.69 CM
BH CV VAS DIALYSIS CONDUIT PROX EDV: 144 CM/SEC
BH CV VAS DIALYSIS CONDUIT PROX FLOW VOL: 1677 ML/MIN
BH CV VAS DIALYSIS CONDUIT PROX PSV: 254 CM/SEC
BH CV VAS DIALYSIS CONDUIT PROX/MID DEPTH: 0.22 CM
BH CV VAS DIALYSIS CONDUIT PROX/MID DIAMETER: 1.06 CM
BH CV VAS DIALYSIS CONDUIT PROX/MID EDV: 153 CM/SEC
BH CV VAS DIALYSIS CONDUIT PROX/MID FLOW VOL: 4142 ML/MIN
BH CV VAS DIALYSIS CONDUIT PROX/MID PSV: 273 CM/SEC
BH CV VAS DIALYSIS LEFT BRANCH 1 DIAMETER: 0.27 CM
BH CV VAS DIALYSIS PRE-INFLOW BRACHIAL DIAMETER: 0.67 CM
BH CV VAS DIALYSIS PRE-INFLOW BRACHIAL EDV: 94 CM/SEC
BH CV VAS DIALYSIS PRE-INFLOW BRACHIAL FLOW VOL: 1411 ML/MIN
BH CV VAS DIALYSIS PRE-INFLOW BRACHIAL PSV: 169 CM/SEC
BH CV VAS DIALYSIS VENOUS OUTFLOW AXILLARY DIAMETER: 1.11 CM
BH CV VAS DIALYSIS VENOUS OUTFLOW AXILLARY EDV: 31 CM/SEC
BH CV VAS DIALYSIS VENOUS OUTFLOW AXILLARY PSV: 61 CM/SEC
BH CV VAS DIALYSIS VENOUS OUTFLOW SUBCLAVIAN DIAMETER: 0.98 CM

## 2020-12-30 PROCEDURE — 93990 DOPPLER FLOW TESTING: CPT

## 2021-01-06 ENCOUNTER — APPOINTMENT (OUTPATIENT)
Dept: VASCULAR SURGERY | Facility: HOSPITAL | Age: 36
End: 2021-01-06

## 2021-01-06 PROCEDURE — G0463 HOSPITAL OUTPT CLINIC VISIT: HCPCS

## 2021-01-11 ENCOUNTER — TRANSCRIBE ORDERS (OUTPATIENT)
Dept: ADMINISTRATIVE | Facility: HOSPITAL | Age: 36
End: 2021-01-11

## 2021-01-11 DIAGNOSIS — N18.6 END STAGE RENAL DISEASE (HCC): ICD-10-CM

## 2021-01-11 DIAGNOSIS — Z99.2 ENCOUNTER FOR EXTRACORPOREAL DIALYSIS (HCC): Primary | ICD-10-CM

## 2021-02-01 ENCOUNTER — OFFICE (AMBULATORY)
Dept: URBAN - METROPOLITAN AREA PATHOLOGY 4 | Facility: PATHOLOGY | Age: 36
End: 2021-02-01
Payer: COMMERCIAL

## 2021-02-01 ENCOUNTER — ON CAMPUS - OUTPATIENT (AMBULATORY)
Dept: URBAN - METROPOLITAN AREA HOSPITAL 2 | Facility: HOSPITAL | Age: 36
End: 2021-02-01
Payer: MEDICAID

## 2021-02-01 ENCOUNTER — OFFICE (AMBULATORY)
Dept: URBAN - METROPOLITAN AREA PATHOLOGY 4 | Facility: PATHOLOGY | Age: 36
End: 2021-02-01
Payer: MEDICAID

## 2021-02-01 VITALS
SYSTOLIC BLOOD PRESSURE: 198 MMHG | HEART RATE: 65 BPM | HEART RATE: 68 BPM | SYSTOLIC BLOOD PRESSURE: 199 MMHG | RESPIRATION RATE: 16 BRPM | SYSTOLIC BLOOD PRESSURE: 202 MMHG | OXYGEN SATURATION: 99 % | DIASTOLIC BLOOD PRESSURE: 100 MMHG | RESPIRATION RATE: 15 BRPM | RESPIRATION RATE: 18 BRPM | OXYGEN SATURATION: 95 % | HEART RATE: 59 BPM | DIASTOLIC BLOOD PRESSURE: 106 MMHG | TEMPERATURE: 98 F | SYSTOLIC BLOOD PRESSURE: 201 MMHG | SYSTOLIC BLOOD PRESSURE: 193 MMHG | DIASTOLIC BLOOD PRESSURE: 101 MMHG | DIASTOLIC BLOOD PRESSURE: 104 MMHG | WEIGHT: 153 LBS | DIASTOLIC BLOOD PRESSURE: 110 MMHG | HEIGHT: 63 IN | HEART RATE: 66 BPM | HEART RATE: 62 BPM | OXYGEN SATURATION: 100 % | SYSTOLIC BLOOD PRESSURE: 203 MMHG | SYSTOLIC BLOOD PRESSURE: 186 MMHG | DIASTOLIC BLOOD PRESSURE: 116 MMHG

## 2021-02-01 DIAGNOSIS — K21.9 GASTRO-ESOPHAGEAL REFLUX DISEASE WITHOUT ESOPHAGITIS: ICD-10-CM

## 2021-02-01 DIAGNOSIS — K31.89 OTHER DISEASES OF STOMACH AND DUODENUM: ICD-10-CM

## 2021-02-01 DIAGNOSIS — K29.60 OTHER GASTRITIS WITHOUT BLEEDING: ICD-10-CM

## 2021-02-01 DIAGNOSIS — D12.3 BENIGN NEOPLASM OF TRANSVERSE COLON: ICD-10-CM

## 2021-02-01 DIAGNOSIS — D50.0 IRON DEFICIENCY ANEMIA SECONDARY TO BLOOD LOSS (CHRONIC): ICD-10-CM

## 2021-02-01 PROBLEM — K63.5 POLYP OF COLON: Status: ACTIVE | Noted: 2021-02-01

## 2021-02-01 LAB
GI HISTOLOGY: A. SELECT: (no result)
GI HISTOLOGY: B. UNSPECIFIED: (no result)
GI HISTOLOGY: PDF REPORT: (no result)

## 2021-02-01 PROCEDURE — 43239 EGD BIOPSY SINGLE/MULTIPLE: CPT | Performed by: INTERNAL MEDICINE

## 2021-02-01 PROCEDURE — 88305 TISSUE EXAM BY PATHOLOGIST: CPT | Mod: 26 | Performed by: INTERNAL MEDICINE

## 2021-02-01 PROCEDURE — 45385 COLONOSCOPY W/LESION REMOVAL: CPT | Performed by: INTERNAL MEDICINE

## 2021-02-01 RX ADMIN — LABETALOL HCL 10 MG: 200 TABLET, FILM COATED ORAL at 14:29

## 2021-02-01 RX ADMIN — Medication 10 MG: at 14:15

## 2021-02-01 NOTE — SERVICEHPINOTES
BRIANNE DE LA FUENTE  is a  35  female   who presents today for a  EGD-Colonoscopy   for   the indications listed below. The updated Patient Profile was reviewed prior to the procedure, in conjunction with the Physical Exam, including medical conditions, surgical procedures, medications, allergies, family history and social history. See Physical Exam time stamp below for date and time of HPI completion.Pre-operatively, I reviewed the indication(s) for the procedure, the risks of the procedure [including but not limited to: unexpected bleeding possibly requiring hospitalization and/or unplanned repeat procedures, perforation possibly requiring surgical treatment, missed lesions and complications of sedation/MAC (also explained by anesthesia staff)]. I have evaluated the patient for risks associated with the planned anesthesia and the procedure to be performed and find the patient an acceptable candidate for IV sedation.Multiple opportunities were provided for any questions or concerns, and all questions were answered satisfactorily before any anesthesia was administered. We will proceed with the planned procedure.BR

## 2021-02-05 ENCOUNTER — OFFICE (AMBULATORY)
Dept: URBAN - METROPOLITAN AREA CLINIC 64 | Facility: CLINIC | Age: 36
End: 2021-02-05
Payer: MEDICAID

## 2021-02-05 VITALS
HEART RATE: 65 BPM | HEIGHT: 63 IN | DIASTOLIC BLOOD PRESSURE: 94 MMHG | SYSTOLIC BLOOD PRESSURE: 157 MMHG | WEIGHT: 146 LBS

## 2021-02-05 DIAGNOSIS — D64.9 ANEMIA, UNSPECIFIED: ICD-10-CM

## 2021-02-05 DIAGNOSIS — R11.0 NAUSEA: ICD-10-CM

## 2021-02-05 DIAGNOSIS — K59.00 CONSTIPATION, UNSPECIFIED: ICD-10-CM

## 2021-02-05 DIAGNOSIS — R10.13 EPIGASTRIC PAIN: ICD-10-CM

## 2021-02-05 DIAGNOSIS — K21.9 GASTRO-ESOPHAGEAL REFLUX DISEASE WITHOUT ESOPHAGITIS: ICD-10-CM

## 2021-02-05 PROCEDURE — 99213 OFFICE O/P EST LOW 20 MIN: CPT | Performed by: NURSE PRACTITIONER

## 2021-02-08 ENCOUNTER — TRANSCRIBE ORDERS (OUTPATIENT)
Dept: ADMINISTRATIVE | Facility: HOSPITAL | Age: 36
End: 2021-02-08

## 2021-02-08 DIAGNOSIS — Z99.2 ENCOUNTER FOR EXTRACORPOREAL DIALYSIS (HCC): Primary | ICD-10-CM

## 2021-02-08 DIAGNOSIS — N18.6 END STAGE RENAL DISEASE (HCC): ICD-10-CM

## 2021-02-09 ENCOUNTER — HOSPITAL ENCOUNTER (OUTPATIENT)
Dept: CARDIOLOGY | Facility: HOSPITAL | Age: 36
Discharge: HOME | End: 2021-02-09
Admitting: PHYSICIAN ASSISTANT

## 2021-02-09 DIAGNOSIS — Z99.2 ENCOUNTER FOR EXTRACORPOREAL DIALYSIS (HCC): ICD-10-CM

## 2021-02-09 DIAGNOSIS — N18.6 END STAGE RENAL DISEASE (HCC): ICD-10-CM

## 2021-02-09 LAB
BH CV VAS DIALYSIS ARTERIAL ANASTOMOSIS DIAMETER: 0.34 CM
BH CV VAS DIALYSIS ARTERIAL ANASTOMOSIS EDV: 249 CM/SEC
BH CV VAS DIALYSIS ARTERIAL ANASTOMOSIS PSV: 523 CM/SEC
BH CV VAS DIALYSIS CONDUIT DIST DEPTH: 0.15 CM
BH CV VAS DIALYSIS CONDUIT DIST DIAMETER: 1.23 CM
BH CV VAS DIALYSIS CONDUIT DIST EDV: 56 CM/SEC
BH CV VAS DIALYSIS CONDUIT DIST FLOW VOL: 1438 ML/MIN
BH CV VAS DIALYSIS CONDUIT DIST PSV: 150 CM/SEC
BH CV VAS DIALYSIS CONDUIT MID DEPTH: 0.2 CM
BH CV VAS DIALYSIS CONDUIT MID DIAMETER: 1.29 CM
BH CV VAS DIALYSIS CONDUIT MID EDV: 56 CM/SEC
BH CV VAS DIALYSIS CONDUIT MID FLOW VOL: 355 ML/MIN
BH CV VAS DIALYSIS CONDUIT MID PSV: 154 CM/SEC
BH CV VAS DIALYSIS CONDUIT PROX DEPTH: 0.39 CM
BH CV VAS DIALYSIS CONDUIT PROX DIAMETER: 0.27 CM
BH CV VAS DIALYSIS CONDUIT PROX EDV: 300 CM/SEC
BH CV VAS DIALYSIS CONDUIT PROX FLOW VOL: 756 ML/MIN
BH CV VAS DIALYSIS CONDUIT PROX PSV: 574 CM/SEC
BH CV VAS DIALYSIS CONDUIT PROX/MID DEPTH: 0.28 CM
BH CV VAS DIALYSIS CONDUIT PROX/MID DIAMETER: 0.28 CM
BH CV VAS DIALYSIS CONDUIT PROX/MID EDV: 143 CM/SEC
BH CV VAS DIALYSIS CONDUIT PROX/MID FLOW VOL: 243 ML/MIN
BH CV VAS DIALYSIS CONDUIT PROX/MID PSV: 310 CM/SEC
BH CV VAS DIALYSIS PRE-INFLOW BRACHIAL DIAMETER: 0.67 CM
BH CV VAS DIALYSIS PRE-INFLOW BRACHIAL EDV: 60 CM/SEC
BH CV VAS DIALYSIS PRE-INFLOW BRACHIAL FLOW VOL: 861 ML/MIN
BH CV VAS DIALYSIS PRE-INFLOW BRACHIAL PSV: 123 CM/SEC
BH CV VAS DIALYSIS VENOUS OUTFLOW AXILLARY DIAMETER: 1.23 CM
BH CV VAS DIALYSIS VENOUS OUTFLOW AXILLARY EDV: 25 CM/SEC
BH CV VAS DIALYSIS VENOUS OUTFLOW AXILLARY PSV: 58 CM/SEC

## 2021-02-09 PROCEDURE — 93990 DOPPLER FLOW TESTING: CPT

## 2021-02-10 ENCOUNTER — APPOINTMENT (OUTPATIENT)
Dept: VASCULAR SURGERY | Facility: HOSPITAL | Age: 36
End: 2021-02-10

## 2021-02-10 ENCOUNTER — LAB (OUTPATIENT)
Dept: LAB | Facility: HOSPITAL | Age: 36
End: 2021-02-10

## 2021-02-10 LAB — SARS-COV-2 ORF1AB RESP QL NAA+PROBE: NOT DETECTED

## 2021-02-10 PROCEDURE — U0004 COV-19 TEST NON-CDC HGH THRU: HCPCS | Performed by: SURGERY

## 2021-02-10 PROCEDURE — G0463 HOSPITAL OUTPT CLINIC VISIT: HCPCS

## 2021-02-10 PROCEDURE — C9803 HOPD COVID-19 SPEC COLLECT: HCPCS | Performed by: SURGERY

## 2021-02-12 ENCOUNTER — HOSPITAL ENCOUNTER (OUTPATIENT)
Facility: HOSPITAL | Age: 36
Setting detail: HOSPITAL OUTPATIENT SURGERY
Discharge: HOME | End: 2021-02-12
Attending: SURGERY | Admitting: SURGERY

## 2021-02-12 VITALS
RESPIRATION RATE: 13 BRPM | SYSTOLIC BLOOD PRESSURE: 174 MMHG | WEIGHT: 150.13 LBS | OXYGEN SATURATION: 99 % | DIASTOLIC BLOOD PRESSURE: 83 MMHG | BODY MASS INDEX: 27.63 KG/M2 | TEMPERATURE: 98.4 F | HEIGHT: 62 IN | HEART RATE: 62 BPM

## 2021-02-12 PROBLEM — T82.858A AV FISTULA STENOSIS (HCC): Status: ACTIVE | Noted: 2021-02-12

## 2021-02-12 PROBLEM — I77.0 AV (ARTERIOVENOUS FISTULA): Chronic | Status: ACTIVE | Noted: 2021-02-12

## 2021-02-12 PROBLEM — N18.6 ESRD (END STAGE RENAL DISEASE): Chronic | Status: ACTIVE | Noted: 2021-02-12

## 2021-02-12 PROCEDURE — C1894 INTRO/SHEATH, NON-LASER: HCPCS | Performed by: SURGERY

## 2021-02-12 PROCEDURE — C1725 CATH, TRANSLUMIN NON-LASER: HCPCS | Performed by: SURGERY

## 2021-02-12 PROCEDURE — C1769 GUIDE WIRE: HCPCS | Performed by: SURGERY

## 2021-02-12 PROCEDURE — 0 IOPAMIDOL PER 1 ML: Performed by: SURGERY

## 2021-02-12 PROCEDURE — C1887 CATHETER, GUIDING: HCPCS | Performed by: SURGERY

## 2021-02-12 RX ORDER — LIDOCAINE HYDROCHLORIDE 20 MG/ML
INJECTION, SOLUTION INFILTRATION; PERINEURAL AS NEEDED
Status: DISCONTINUED | OUTPATIENT
Start: 2021-02-12 | End: 2021-02-12 | Stop reason: HOSPADM

## 2021-02-12 NOTE — OP NOTE
Operative Note  [facility]: [facility]  Date of Admission: 2/12/2021  OR Date: 2/12/2021    Pre-op Diagnosis:  1.  AV fistula dysfunction  2.  End-stage renal disease on hemodialysis    Post-op Diagnosis:  1.  AV fistula dysfunction  2.  End-stage renal disease on hemodialysis    Procedure:   Left brachiobasilic AV fistulogram with anastomotic balloon angioplasty to 5 mm and juxta anastomotic balloon angioplasty to 8 mm    Surgeon: Miguel Angel Basurto MD    Anesthesia: Local    Estimated Blood Loss: Minimal    Specimen: None    Complications: None    Findings: Possible anastomotic stenosis.  Irregularity and stenosis of the juxta anastomotic and proximal AV fistula (in the distal arm).  Marked improvement in AV fistula thrill following balloon dilatation of the anastomosis to 5 mm.  Additional improvement in AV fistula thrill amplitude following dilation of the proximal fistula to 8 mm.  No left central vein stenosis.    Indications: 35-year-old woman with chronic kidney disease maintained on hemodialysis.  Undergoing that creation of a left brachiobasilic AV fistula.  Initial fistula scan demonstrated the fistula to be widely patent with excellent volume flows ranging 2-4 L/min. Recent scan demonstrated a failing fistula with volume flows less than 600 mL/min, with associated anastomotic and juxta anastomotic stenoses.  Submits now for fistulogram.       Procedure:  The patient was brought to the Cath Lab and positioned supine the operating table.  The lead shield was placed around her pelvis in a circumferential fashion.  The left upper extremity was prepared with ChloraPrep and draped in a sterile fashion.  Using palpation, the fistula in the mid to proximal arm was cannulated with a micropuncture kit and micropuncture catheter was placed.  A Glidewire was advanced through the anastomosis into the distal brachial artery and a 5 Romansh sheath was placed.  The guide cath was then passed through the anastomosis and  positioned in the distal brachial artery.  Contrast injection demonstrated marked irregularity and 70% stenosis of the basilic vein in the distal arm, proximal with respect to the fistula.  Since the duplex scan had demonstrated an anastomotic stenosis, I began there.  The wire was replaced and the catheter removed.  A 5 x 20 mm balloon was inflated in the anastomosis, and an angiographic waste was eliminated.  After a 1 minute inflation to 15 saritha, the balloon was deflated and removed.  There was already a marked improvement in the amplitude of the thrill.  The catheter was replaced, and a follow-up study demonstrated improvement.  There was still stenosis in the basilic vein, and I was not certain I could reach that from where I cannulated the fistula.  The 5 Mauritanian sheath was removed and a bolster suture was placed.  Under local anesthesia, I cannulated the fistula in the proximal arm, very near the deltoid muscle with a micropuncture kit and the micropuncture kit was exchanged for a 6 Mauritanian sheath.  An 8 x 60 mm balloon was brought onto the field and the basilic vein stenosis in the distal arm was treated with balloon angioplasty to 8 mm for 1 minute.  Following deflation and removal of the balloon, a follow-up contrast study demonstrated residual stenosis of about 20%, but there was incremental improvement in the amplitude of the thrill following the second inflation as well.  The result was accepted.  Subsequent contrast studies were obtained through the sheath, demonstrating the proximal basilic vein in the central veins to be widely patent.  A second bolster stitch was applied at the puncture site and the 6 Mauritanian sheath was removed.  A sterile dressing was applied  At its conclusion the patient had tolerated the procedure well, and without apparent complications.  Sponge and needle counts were correct.  The patient was taken to the recovery area in stable condition.    Angiographic findings:  The distal  brachial artery is normal in appearance.  The anastomosis is patent but small.  There was a mild distal basilic vein stenosis approximately 3 to 5 cm off the anastomosis, with more significant irregularity and stenosis in the mid to distal arm.  The remainder of the fistula to the right atrium is widely patent.  Following balloon angioplasty of the anastomosis to 5 mm, there was marked improvement in the basilic vein thrill, but the appearance of the anastomosis appeared about the same, perhaps related to the tortuosity.  Following angioplasty of the distal basilic vein, there is marked improvement, with perhaps 20% residual stenosis.  The amplitude of the thrill was excellent.  There were no central vein stenoses.    Miguel Angel Basurto MD     Date: 2/12/2021  Time: 09:01 EST

## 2021-02-12 NOTE — NURSING NOTE
Patient given discharge instructions with the use of the   IPad. Patient instructed that she is okay for dialysis for tomorrow and they will remove her dressing. Patient instructed to take instructions with her to dialysis. Patient was educate on not lifting objects with her left arm for the day. She was instructed to follow up with Vascular in 2 weeks.

## 2021-02-12 NOTE — H&P
[facility]   HISTORY AND PHYSICAL    Patient Name: Geno Crouch  : 1985  MRN: [id number]  Primary Care Physician: Kristi Multani APRN  Date of admission: 2021    Subjective   Subjective     Chief Complaint: AV fistula dysfunction    35-year-old woman with chronic kidney disease maintained on hemodialysis.  Underwent creation of a left brachiobasilic AV fistula.  Presented for evaluation of reduced flow and question about prolonged bleeding from AV fistula.  Initial fistula scan demonstrated excellent performance of the fistula, with volume flows greater than 2 L/min and no stenoses.  Recent fistula scan shows a failing fistula with marked decreased in volume flow with anastomotic and juxta anastomotic stenosis, possibly related to intimal hyperplasia.  Submits now for AV fistulogram with possible angioplasty.  Patient comfortable, with no complaints at the present time.    Review of Systems   Constitutional: Negative.    HENT: Negative.    Eyes: Negative.    Respiratory: Negative.    Cardiovascular: Negative.       Personal History     Past Medical History:   Diagnosis Date   • CKD (chronic kidney disease)    • Diabetes mellitus (CMS/[facility])    • Dialysis patient (CMS/[facility])    • Hypertension        Past Surgical History:   Procedure Laterality Date   • ARTERIOVENOUS FISTULA     • ARTERIOVENOUS FISTULA/SHUNT SURGERY Left 2020    Procedure: LEFT ARM FISTULA TRANSPOSITION;  Surgeon: Arnel Douglas MD;  Location: [facility];  Service: Vascular;  Laterality: Left;       Family History: family history is not on file. Otherwise pertinent FHx was reviewed and not pertinent to current issue.    Social History:  reports that she has never smoked. She does not have any smokeless tobacco history on file. She reports previous alcohol use. She reports previous drug use.    Home Medications:  HYDROcodone-acetaminophen, bumetanide, cloNIDine, furosemide, hydrALAZINE, insulin aspart prot-insulin  aspart, labetalol, and minoxidil    Allergies:  No Known Allergies    Objective    Objective     Vitals:  Temp:  [98.4 °F (36.9 °C)] 98.4 °F (36.9 °C)  Heart Rate:  [62] 62  Resp:  [13] 13  BP: (152)/(80) 152/80    Physical Exam  Constitutional:       General: She is not in acute distress.     Appearance: Normal appearance. She is normal weight. She is not ill-appearing, toxic-appearing or diaphoretic.   HENT:      Head: Normocephalic and atraumatic.      Right Ear: External ear normal.      Left Ear: External ear normal.      Nose:      Comments: Patient wearing mask     Mouth/Throat:      Comments: Patient wearing mask  Eyes:      Extraocular Movements: Extraocular movements intact.      Pupils: Pupils are equal, round, and reactive to light.   Neck:      Musculoskeletal: Normal range of motion and neck supple.      Vascular: No carotid bruit.   Cardiovascular:      Rate and Rhythm: Normal rate and regular rhythm.      Comments: Thrill and bruit present in the left brachiobasilic AV fistula.  No hematoma or pseudoaneurysm suspected.  No arm swelling.  Pulmonary:      Effort: Pulmonary effort is normal. No respiratory distress.      Breath sounds: Normal breath sounds.   Abdominal:      General: Abdomen is flat.      Palpations: Abdomen is soft.      Tenderness: There is no abdominal tenderness.   Musculoskeletal: Normal range of motion.   Skin:     General: Skin is warm and dry.      Capillary Refill: Capillary refill takes less than 2 seconds.   Neurological:      General: No focal deficit present.      Mental Status: She is alert and oriented to person, place, and time.      Cranial Nerves: No cranial nerve deficit.   Psychiatric:         Mood and Affect: Mood normal.         Behavior: Behavior normal.         Thought Content: Thought content normal.         Judgment: Judgment normal.     Result Review    Result Review:  I have personally reviewed the results from the time of this admission to 02/12/21 7:26 AM  EST and agree with these findings:  []  Laboratory  []  Microbiology  [x]  Radiology  []  EKG/Telemetry   []  Cardiology/Vascular   []  Pathology  []  Old records  []  Other:  Most notable findings include: AV fistula scan 2/9/2021 demonstrating anastomotic and juxtaanastomotic stenosis with decreased volume flow.    Assessment/Plan   Assessment / Plan     Brief Patient Summary:  Geno Crouch is a 35 y.o. female with chronic kidney disease maintained on hemodialysis with a failing left brachiobasilic AV fistula.  Presents for AV fistulogram possible angioplasty.    Active Hospital Problems:  Active Hospital Problems    Diagnosis   • **AV fistula stenosis (CMS/HCC)   • ESRD (end stage renal disease) (CMS/HCC)   • AV (arteriovenous fistula) (CMS/[facility])     Plan: Left brachiocephalic AV fistulogram with possible angioplasty.    Electronically signed by Miguel Angel Basurto MD, 02/12/21, 7:26 AM EST.

## 2021-04-08 ENCOUNTER — APPOINTMENT (OUTPATIENT)
Dept: VASCULAR SURGERY | Facility: HOSPITAL | Age: 36
End: 2021-04-08

## 2021-04-08 ENCOUNTER — APPOINTMENT (OUTPATIENT)
Dept: CARDIOLOGY | Facility: HOSPITAL | Age: 36
End: 2021-04-08

## 2021-04-15 ENCOUNTER — HOSPITAL ENCOUNTER (OUTPATIENT)
Dept: CARDIOLOGY | Facility: HOSPITAL | Age: 36
Discharge: HOME | End: 2021-04-15

## 2021-04-15 ENCOUNTER — APPOINTMENT (OUTPATIENT)
Dept: VASCULAR SURGERY | Facility: HOSPITAL | Age: 36
End: 2021-04-15

## 2021-04-15 DIAGNOSIS — Z99.2 ENCOUNTER FOR EXTRACORPOREAL DIALYSIS (HCC): ICD-10-CM

## 2021-04-15 DIAGNOSIS — N18.6 END STAGE RENAL DISEASE (HCC): ICD-10-CM

## 2021-04-15 LAB
BH CV VAS DIALYSIS ARTERIAL ANASTOMOSIS DIAMETER: 0.56 CM
BH CV VAS DIALYSIS ARTERIAL ANASTOMOSIS EDV: 241 CM/SEC
BH CV VAS DIALYSIS ARTERIAL ANASTOMOSIS PSV: 468 CM/SEC
BH CV VAS DIALYSIS CONDUIT DIST DEPTH: 0.31 CM
BH CV VAS DIALYSIS CONDUIT DIST DIAMETER: 1.32 CM
BH CV VAS DIALYSIS CONDUIT DIST EDV: 32 CM/SEC
BH CV VAS DIALYSIS CONDUIT DIST FLOW VOL: 2053 ML/MIN
BH CV VAS DIALYSIS CONDUIT DIST PSV: 65 CM/SEC
BH CV VAS DIALYSIS CONDUIT MID DEPTH: 0.21 CM
BH CV VAS DIALYSIS CONDUIT MID DIAMETER: 1.2 CM
BH CV VAS DIALYSIS CONDUIT MID EDV: 60 CM/SEC
BH CV VAS DIALYSIS CONDUIT MID FLOW VOL: 2113 ML/MIN
BH CV VAS DIALYSIS CONDUIT MID PSV: 123 CM/SEC
BH CV VAS DIALYSIS CONDUIT PROX DEPTH: 0.38 CM
BH CV VAS DIALYSIS CONDUIT PROX DIAMETER: 0.41 CM
BH CV VAS DIALYSIS CONDUIT PROX EDV: 351 CM/SEC
BH CV VAS DIALYSIS CONDUIT PROX FLOW VOL: 1361 ML/MIN
BH CV VAS DIALYSIS CONDUIT PROX PSV: 626 CM/SEC
BH CV VAS DIALYSIS PRE-INFLOW BRACHIAL DIAMETER: 0.7 CM
BH CV VAS DIALYSIS PRE-INFLOW BRACHIAL EDV: 96 CM/SEC
BH CV VAS DIALYSIS PRE-INFLOW BRACHIAL FLOW VOL: 1796 ML/MIN
BH CV VAS DIALYSIS PRE-INFLOW BRACHIAL PSV: 184 CM/SEC
BH CV VAS DIALYSIS VENOUS OUTFLOW AXILLARY DIAMETER: 1.48 CM
BH CV VAS DIALYSIS VENOUS OUTFLOW AXILLARY EDV: 45 CM/SEC
BH CV VAS DIALYSIS VENOUS OUTFLOW AXILLARY PSV: 105 CM/SEC
BH CV VAS DIALYSIS VENOUS OUTFLOW SUBCLAVIAN DIAMETER: 1.28 CM
BH CV VAS DIALYSIS VENOUS OUTFLOW SUBCLAVIAN EDV: 47 CM/SEC
BH CV VAS DIALYSIS VENOUS OUTFLOW SUBCLAVIAN PSV: 115 CM/SEC

## 2021-04-15 PROCEDURE — 93990 DOPPLER FLOW TESTING: CPT

## 2021-04-15 PROCEDURE — G0463 HOSPITAL OUTPT CLINIC VISIT: HCPCS

## 2021-06-17 ENCOUNTER — APPOINTMENT (OUTPATIENT)
Dept: VASCULAR SURGERY | Facility: HOSPITAL | Age: 36
End: 2021-06-17

## 2021-06-17 ENCOUNTER — TRANSCRIBE ORDERS (OUTPATIENT)
Dept: ADMINISTRATIVE | Facility: HOSPITAL | Age: 36
End: 2021-06-17

## 2021-06-17 DIAGNOSIS — N18.6 END STAGE RENAL DISEASE (HCC): ICD-10-CM

## 2021-06-17 DIAGNOSIS — Z99.2 ENCOUNTER FOR EXTRACORPOREAL DIALYSIS (HCC): Primary | ICD-10-CM

## 2021-06-22 ENCOUNTER — HOSPITAL ENCOUNTER (OUTPATIENT)
Dept: CARDIOLOGY | Facility: HOSPITAL | Age: 36
Discharge: HOME | End: 2021-06-22
Admitting: PHYSICIAN ASSISTANT

## 2021-06-22 DIAGNOSIS — Z99.2 ENCOUNTER FOR EXTRACORPOREAL DIALYSIS (HCC): ICD-10-CM

## 2021-06-22 DIAGNOSIS — N18.6 END STAGE RENAL DISEASE (HCC): ICD-10-CM

## 2021-06-22 LAB
BH CV VAS DIALYSIS ARTERIAL ANASTOMOSIS EDV: 346 CM/SEC
BH CV VAS DIALYSIS ARTERIAL ANASTOMOSIS PSV: 639 CM/SEC
BH CV VAS DIALYSIS CONDUIT DIST DEPTH: 0.25 CM
BH CV VAS DIALYSIS CONDUIT DIST DIAMETER: 1.58 CM
BH CV VAS DIALYSIS CONDUIT DIST EDV: 34 CM/SEC
BH CV VAS DIALYSIS CONDUIT DIST FLOW VOL: 1807 ML/MIN
BH CV VAS DIALYSIS CONDUIT DIST PSV: 75 CM/SEC
BH CV VAS DIALYSIS CONDUIT MID DEPTH: 0.18 CM
BH CV VAS DIALYSIS CONDUIT MID DIAMETER: 1.15 CM
BH CV VAS DIALYSIS CONDUIT MID EDV: 106 CM/SEC
BH CV VAS DIALYSIS CONDUIT MID FLOW VOL: 2648 ML/MIN
BH CV VAS DIALYSIS CONDUIT MID PSV: 220 CM/SEC
BH CV VAS DIALYSIS CONDUIT PROX DEPTH: 0.42 CM
BH CV VAS DIALYSIS CONDUIT PROX DIAMETER: 0.78 CM
BH CV VAS DIALYSIS CONDUIT PROX EDV: 227 CM/SEC
BH CV VAS DIALYSIS CONDUIT PROX FLOW VOL: 2550 ML/MIN
BH CV VAS DIALYSIS CONDUIT PROX PSV: 470 CM/SEC
BH CV VAS DIALYSIS PRE-INFLOW BRACHIAL DIAMETER: 0.7 CM
BH CV VAS DIALYSIS PRE-INFLOW BRACHIAL EDV: 112 CM/SEC
BH CV VAS DIALYSIS PRE-INFLOW BRACHIAL FLOW VOL: 1986 ML/MIN
BH CV VAS DIALYSIS PRE-INFLOW BRACHIAL PSV: 199 CM/SEC
BH CV VAS DIALYSIS VENOUS OUTFLOW SUBCLAVIAN DIAMETER: 1.31 CM
BH CV VAS DIALYSIS VENOUS OUTFLOW SUBCLAVIAN EDV: 126 CM/SEC
BH CV VAS DIALYSIS VENOUS OUTFLOW SUBCLAVIAN PSV: 263 CM/SEC
MAXIMAL PREDICTED HEART RATE: 183 BPM
STRESS TARGET HR: 156 BPM

## 2021-06-22 PROCEDURE — 93990 DOPPLER FLOW TESTING: CPT

## 2021-06-24 ENCOUNTER — OFFICE VISIT (OUTPATIENT)
Dept: VASCULAR SURGERY | Facility: HOSPITAL | Age: 36
End: 2021-06-24

## 2021-06-24 PROCEDURE — G0463 HOSPITAL OUTPT CLINIC VISIT: HCPCS

## 2021-06-28 ENCOUNTER — TRANSCRIBE ORDERS (OUTPATIENT)
Dept: ADMINISTRATIVE | Facility: HOSPITAL | Age: 36
End: 2021-06-28

## 2021-06-28 DIAGNOSIS — N18.6 END STAGE RENAL DISEASE (HCC): ICD-10-CM

## 2021-06-28 DIAGNOSIS — Z99.2 ENCOUNTER FOR EXTRACORPOREAL DIALYSIS (HCC): Primary | ICD-10-CM

## 2021-09-30 ENCOUNTER — APPOINTMENT (OUTPATIENT)
Dept: CARDIOLOGY | Facility: HOSPITAL | Age: 36
End: 2021-09-30

## 2021-09-30 ENCOUNTER — APPOINTMENT (OUTPATIENT)
Dept: VASCULAR SURGERY | Facility: HOSPITAL | Age: 36
End: 2021-09-30

## 2021-11-03 ENCOUNTER — HOSPITAL ENCOUNTER (OUTPATIENT)
Dept: CARDIOLOGY | Facility: HOSPITAL | Age: 36
Discharge: HOME | End: 2021-11-03

## 2021-11-03 ENCOUNTER — APPOINTMENT (OUTPATIENT)
Dept: VASCULAR SURGERY | Facility: HOSPITAL | Age: 36
End: 2021-11-03

## 2021-11-03 DIAGNOSIS — N18.6 END STAGE RENAL DISEASE (HCC): ICD-10-CM

## 2021-11-03 DIAGNOSIS — Z99.2 ENCOUNTER FOR EXTRACORPOREAL DIALYSIS (HCC): ICD-10-CM

## 2021-11-03 LAB
BH CV VAS DIALYSIS ARTERIAL ANASTOMOSIS EDV: 223 CM/SEC
BH CV VAS DIALYSIS ARTERIAL ANASTOMOSIS PSV: 468 CM/SEC
BH CV VAS DIALYSIS CONDUIT DIST DEPTH: 0.18 CM
BH CV VAS DIALYSIS CONDUIT DIST DIAMETER: 1.4 CM
BH CV VAS DIALYSIS CONDUIT DIST EDV: 33 CM/SEC
BH CV VAS DIALYSIS CONDUIT DIST FLOW VOL: 1551 ML/MIN
BH CV VAS DIALYSIS CONDUIT DIST PSV: 65 CM/SEC
BH CV VAS DIALYSIS CONDUIT MID DEPTH: 0.4 CM
BH CV VAS DIALYSIS CONDUIT MID DIAMETER: 0.91 CM
BH CV VAS DIALYSIS CONDUIT MID EDV: 132 CM/SEC
BH CV VAS DIALYSIS CONDUIT MID FLOW VOL: 3353 ML/MIN
BH CV VAS DIALYSIS CONDUIT MID PSV: 296 CM/SEC
BH CV VAS DIALYSIS CONDUIT PROX DEPTH: 0.3 CM
BH CV VAS DIALYSIS CONDUIT PROX DIAMETER: 0.66 CM
BH CV VAS DIALYSIS CONDUIT PROX EDV: 273 CM/SEC
BH CV VAS DIALYSIS CONDUIT PROX FLOW VOL: 2053 ML/MIN
BH CV VAS DIALYSIS CONDUIT PROX PSV: 507 CM/SEC
BH CV VAS DIALYSIS PRE-INFLOW BRACHIAL DIAMETER: 0.76 CM
BH CV VAS DIALYSIS PRE-INFLOW BRACHIAL EDV: 55 CM/SEC
BH CV VAS DIALYSIS PRE-INFLOW BRACHIAL FLOW VOL: 1268 ML/MIN
BH CV VAS DIALYSIS PRE-INFLOW BRACHIAL PSV: 129 CM/SEC
BH CV VAS DIALYSIS VENOUS OUTFLOW AXILLARY EDV: 22 CM/SEC
BH CV VAS DIALYSIS VENOUS OUTFLOW AXILLARY PSV: 43 CM/SEC
MAXIMAL PREDICTED HEART RATE: 183 BPM
STRESS TARGET HR: 156 BPM

## 2021-11-03 PROCEDURE — 93990 DOPPLER FLOW TESTING: CPT

## 2021-11-03 PROCEDURE — G0463 HOSPITAL OUTPT CLINIC VISIT: HCPCS

## 2022-04-13 ENCOUNTER — TRANSCRIBE ORDERS (OUTPATIENT)
Dept: ADMINISTRATIVE | Facility: HOSPITAL | Age: 37
End: 2022-04-13

## 2022-04-13 DIAGNOSIS — Z99.2 ENCOUNTER FOR EXTRACORPOREAL DIALYSIS: ICD-10-CM

## 2022-04-13 DIAGNOSIS — N18.6 END STAGE RENAL DISEASE: Primary | ICD-10-CM

## 2022-11-10 ENCOUNTER — HOSPITAL ENCOUNTER (OUTPATIENT)
Dept: CARDIOLOGY | Facility: HOSPITAL | Age: 37
Discharge: HOME | End: 2022-11-10

## 2022-11-10 ENCOUNTER — APPOINTMENT (OUTPATIENT)
Dept: VASCULAR SURGERY | Facility: HOSPITAL | Age: 37
End: 2022-11-10

## 2022-11-10 DIAGNOSIS — N18.6 END STAGE RENAL DISEASE: ICD-10-CM

## 2022-11-10 DIAGNOSIS — Z99.2 ENCOUNTER FOR EXTRACORPOREAL DIALYSIS: ICD-10-CM

## 2022-11-10 LAB
BH CV VAS DIALYSIS ARTERIAL ANASTOMOSIS EDV: 204 CM/SEC
BH CV VAS DIALYSIS ARTERIAL ANASTOMOSIS PSV: 543 CM/SEC
BH CV VAS DIALYSIS CONDUIT DIST DEPTH: 0.2 CM
BH CV VAS DIALYSIS CONDUIT DIST DIAMETER: 1.18 CM
BH CV VAS DIALYSIS CONDUIT DIST EDV: 46 CM/SEC
BH CV VAS DIALYSIS CONDUIT DIST FLOW VOL: 1996 ML/MIN
BH CV VAS DIALYSIS CONDUIT DIST PSV: 76 CM/SEC
BH CV VAS DIALYSIS CONDUIT MID DEPTH: 0.22 CM
BH CV VAS DIALYSIS CONDUIT MID DIAMETER: 1.93 CM
BH CV VAS DIALYSIS CONDUIT MID EDV: 76 CM/SEC
BH CV VAS DIALYSIS CONDUIT MID FLOW VOL: 2385 ML/MIN
BH CV VAS DIALYSIS CONDUIT MID PSV: 201 CM/SEC
BH CV VAS DIALYSIS CONDUIT MID/DIST DEPTH: 0.3 CM
BH CV VAS DIALYSIS CONDUIT MID/DIST DIAMETER: 1.45 CM
BH CV VAS DIALYSIS CONDUIT MID/DIST EDV: 57 CM/SEC
BH CV VAS DIALYSIS CONDUIT MID/DIST FLOW VOL: 3314 ML/MIN
BH CV VAS DIALYSIS CONDUIT MID/DIST PSV: 147 CM/SEC
BH CV VAS DIALYSIS CONDUIT PROX DEPTH: 0.18 CM
BH CV VAS DIALYSIS CONDUIT PROX DIAMETER: 0.48 CM
BH CV VAS DIALYSIS CONDUIT PROX EDV: 336 CM/SEC
BH CV VAS DIALYSIS CONDUIT PROX FLOW VOL: 1719 ML/MIN
BH CV VAS DIALYSIS CONDUIT PROX PSV: 609 CM/SEC
BH CV VAS DIALYSIS CONDUIT PROX/MID DEPTH: 0.23 CM
BH CV VAS DIALYSIS CONDUIT PROX/MID DIAMETER: 2.2 CM
BH CV VAS DIALYSIS CONDUIT PROX/MID EDV: 45 CM/SEC
BH CV VAS DIALYSIS CONDUIT PROX/MID FLOW VOL: 1177 ML/MIN
BH CV VAS DIALYSIS CONDUIT PROX/MID PSV: 329 CM/SEC
BH CV VAS DIALYSIS PRE-INFLOW BRACHIAL DIAMETER: 0.89 CM
BH CV VAS DIALYSIS PRE-INFLOW BRACHIAL EDV: 51 CM/SEC
BH CV VAS DIALYSIS PRE-INFLOW BRACHIAL FLOW VOL: 1373 ML/MIN
BH CV VAS DIALYSIS PRE-INFLOW BRACHIAL PSV: 129 CM/SEC
BH CV VAS DIALYSIS VENOUS OUTFLOW AXILLARY DIAMETER: 1.7 CM
BH CV VAS DIALYSIS VENOUS OUTFLOW AXILLARY EDV: 67 CM/SEC
BH CV VAS DIALYSIS VENOUS OUTFLOW AXILLARY PSV: 90 CM/SEC
MAXIMAL PREDICTED HEART RATE: 182 BPM
STRESS TARGET HR: 155 BPM

## 2022-11-10 PROCEDURE — 93990 DOPPLER FLOW TESTING: CPT

## 2022-11-10 PROCEDURE — G0463 HOSPITAL OUTPT CLINIC VISIT: HCPCS

## 2022-11-25 ENCOUNTER — TRANSCRIBE ORDERS (OUTPATIENT)
Dept: ADMINISTRATIVE | Facility: HOSPITAL | Age: 37
End: 2022-11-25

## 2022-11-25 DIAGNOSIS — N18.6 END STAGE RENAL DISEASE: Primary | ICD-10-CM

## 2022-11-25 DIAGNOSIS — Z99.2 ENCOUNTER FOR EXTRACORPOREAL DIALYSIS: ICD-10-CM

## 2023-03-13 ENCOUNTER — TRANSCRIBE ORDERS (OUTPATIENT)
Dept: ADMINISTRATIVE | Facility: HOSPITAL | Age: 38
End: 2023-03-13
Payer: MEDICAID

## 2023-03-13 DIAGNOSIS — Z99.2 ENCOUNTER FOR EXTRACORPOREAL DIALYSIS: ICD-10-CM

## 2023-03-13 DIAGNOSIS — N18.6 END STAGE RENAL DISEASE: Primary | ICD-10-CM

## 2023-03-16 ENCOUNTER — APPOINTMENT (OUTPATIENT)
Dept: VASCULAR SURGERY | Facility: HOSPITAL | Age: 38
End: 2023-03-16
Payer: MEDICAID

## 2023-03-16 ENCOUNTER — HOSPITAL ENCOUNTER (OUTPATIENT)
Dept: CARDIOLOGY | Facility: HOSPITAL | Age: 38
Discharge: HOME | End: 2023-03-16
Payer: MEDICAID

## 2023-03-16 DIAGNOSIS — Z99.2 ENCOUNTER FOR EXTRACORPOREAL DIALYSIS: ICD-10-CM

## 2023-03-16 DIAGNOSIS — N18.6 END STAGE RENAL DISEASE: ICD-10-CM

## 2023-03-16 LAB
BH CV VAS DIALYSIS ARTERIAL ANASTOMOSIS EDV: 83 CM/SEC
BH CV VAS DIALYSIS ARTERIAL ANASTOMOSIS PSV: 171 CM/SEC
BH CV VAS DIALYSIS CONDUIT DIST DEPTH: 0.24 CM
BH CV VAS DIALYSIS CONDUIT DIST DIAMETER: 1.6 CM
BH CV VAS DIALYSIS CONDUIT DIST EDV: 22 CM/SEC
BH CV VAS DIALYSIS CONDUIT DIST PSV: 37 CM/SEC
BH CV VAS DIALYSIS CONDUIT MID DEPTH: 0.7 CM
BH CV VAS DIALYSIS CONDUIT MID DIAMETER: 0.92 CM
BH CV VAS DIALYSIS CONDUIT MID EDV: 82 CM/SEC
BH CV VAS DIALYSIS CONDUIT MID FLOW VOL: 2078 ML/MIN
BH CV VAS DIALYSIS CONDUIT MID PSV: 174 CM/SEC
BH CV VAS DIALYSIS CONDUIT MID/DIST DIAMETER: 2 CM
BH CV VAS DIALYSIS CONDUIT PROX DEPTH: 0.18 CM
BH CV VAS DIALYSIS CONDUIT PROX DIAMETER: 0.46 CM
BH CV VAS DIALYSIS CONDUIT PROX EDV: 315 CM/SEC
BH CV VAS DIALYSIS CONDUIT PROX FLOW VOL: 813 ML/MIN
BH CV VAS DIALYSIS CONDUIT PROX PSV: 527 CM/SEC
BH CV VAS DIALYSIS CONDUIT PROX/MID DIAMETER: 2.5 CM
BH CV VAS DIALYSIS PRE-INFLOW BRACHIAL DIAMETER: 0.91 CM
BH CV VAS DIALYSIS PRE-INFLOW BRACHIAL EDV: 53 CM/SEC
BH CV VAS DIALYSIS PRE-INFLOW BRACHIAL FLOW VOL: 1476 ML/MIN
BH CV VAS DIALYSIS PRE-INFLOW BRACHIAL PSV: 140 CM/SEC
BH CV VAS DIALYSIS VENOUS OUTFLOW AXILLARY DIAMETER: 1.8 CM
BH CV VAS DIALYSIS VENOUS OUTFLOW AXILLARY PSV: 60 CM/SEC
BH CV VAS DIALYSIS VENOUS OUTFLOW SUBCLAVIAN DIAMETER: 1.5 CM
MAXIMAL PREDICTED HEART RATE: 181 BPM
STRESS TARGET HR: 154 BPM

## 2023-03-16 PROCEDURE — 93990 DOPPLER FLOW TESTING: CPT

## 2023-03-16 PROCEDURE — G0463 HOSPITAL OUTPT CLINIC VISIT: HCPCS

## 2023-03-20 ENCOUNTER — TRANSCRIBE ORDERS (OUTPATIENT)
Dept: ADMINISTRATIVE | Facility: HOSPITAL | Age: 38
End: 2023-03-20
Payer: MEDICAID

## 2023-03-20 DIAGNOSIS — N18.6 END STAGE RENAL DISEASE: Primary | ICD-10-CM

## 2023-03-20 DIAGNOSIS — Z99.2 ENCOUNTER FOR EXTRACORPOREAL DIALYSIS: ICD-10-CM

## 2023-07-18 ENCOUNTER — INPATIENT HOSPITAL (AMBULATORY)
Dept: URBAN - METROPOLITAN AREA HOSPITAL 76 | Facility: HOSPITAL | Age: 38
End: 2023-07-18
Payer: MEDICAID

## 2023-07-18 DIAGNOSIS — R11.2 NAUSEA WITH VOMITING, UNSPECIFIED: ICD-10-CM

## 2023-07-18 DIAGNOSIS — R10.13 EPIGASTRIC PAIN: ICD-10-CM

## 2023-07-18 PROCEDURE — 99222 1ST HOSP IP/OBS MODERATE 55: CPT | Performed by: NURSE PRACTITIONER

## 2023-07-19 ENCOUNTER — INPATIENT HOSPITAL (AMBULATORY)
Dept: URBAN - METROPOLITAN AREA HOSPITAL 76 | Facility: HOSPITAL | Age: 38
End: 2023-07-19
Payer: MEDICAID

## 2023-07-19 DIAGNOSIS — K31.89 OTHER DISEASES OF STOMACH AND DUODENUM: ICD-10-CM

## 2023-07-19 DIAGNOSIS — R11.2 NAUSEA WITH VOMITING, UNSPECIFIED: ICD-10-CM

## 2023-07-19 DIAGNOSIS — K29.50 UNSPECIFIED CHRONIC GASTRITIS WITHOUT BLEEDING: ICD-10-CM

## 2023-07-19 DIAGNOSIS — R10.13 EPIGASTRIC PAIN: ICD-10-CM

## 2023-07-19 DIAGNOSIS — K29.80 DUODENITIS WITHOUT BLEEDING: ICD-10-CM

## 2023-07-19 DIAGNOSIS — K20.80 OTHER ESOPHAGITIS WITHOUT BLEEDING: ICD-10-CM

## 2023-07-19 PROCEDURE — 43239 EGD BIOPSY SINGLE/MULTIPLE: CPT | Performed by: INTERNAL MEDICINE

## 2023-07-20 ENCOUNTER — INPATIENT HOSPITAL (AMBULATORY)
Dept: URBAN - METROPOLITAN AREA HOSPITAL 76 | Facility: HOSPITAL | Age: 38
End: 2023-07-20
Payer: MEDICAID

## 2023-07-20 DIAGNOSIS — K29.80 DUODENITIS WITHOUT BLEEDING: ICD-10-CM

## 2023-07-20 DIAGNOSIS — K29.50 UNSPECIFIED CHRONIC GASTRITIS WITHOUT BLEEDING: ICD-10-CM

## 2023-07-20 DIAGNOSIS — R10.9 UNSPECIFIED ABDOMINAL PAIN: ICD-10-CM

## 2023-07-20 DIAGNOSIS — K20.80 OTHER ESOPHAGITIS WITHOUT BLEEDING: ICD-10-CM

## 2023-07-20 DIAGNOSIS — R11.2 NAUSEA WITH VOMITING, UNSPECIFIED: ICD-10-CM

## 2023-07-20 DIAGNOSIS — K59.00 CONSTIPATION, UNSPECIFIED: ICD-10-CM

## 2023-07-20 PROCEDURE — 99232 SBSQ HOSP IP/OBS MODERATE 35: CPT | Performed by: NURSE PRACTITIONER

## 2023-09-05 ENCOUNTER — OFFICE (AMBULATORY)
Dept: URBAN - METROPOLITAN AREA CLINIC 64 | Facility: CLINIC | Age: 38
End: 2023-09-05
Payer: MEDICAID

## 2023-09-05 VITALS
HEART RATE: 64 BPM | WEIGHT: 144 LBS | SYSTOLIC BLOOD PRESSURE: 180 MMHG | DIASTOLIC BLOOD PRESSURE: 98 MMHG | HEIGHT: 63 IN

## 2023-09-05 DIAGNOSIS — K59.00 CONSTIPATION, UNSPECIFIED: ICD-10-CM

## 2023-09-05 DIAGNOSIS — K81.1 CHRONIC CHOLECYSTITIS: ICD-10-CM

## 2023-09-05 DIAGNOSIS — R11.0 NAUSEA: ICD-10-CM

## 2023-09-05 PROCEDURE — 99214 OFFICE O/P EST MOD 30 MIN: CPT | Performed by: INTERNAL MEDICINE

## 2023-09-05 RX ORDER — ONDANSETRON HYDROCHLORIDE 4 MG/1
TABLET, FILM COATED ORAL
Qty: 20 | Refills: 6 | Status: ACTIVE
Start: 2023-09-05

## 2023-09-05 RX ORDER — OMEPRAZOLE 40 MG/1
40 CAPSULE, DELAYED RELEASE ORAL
Qty: 30 | Refills: 11 | Status: ACTIVE
Start: 2023-09-05

## 2023-09-05 RX ORDER — LACTULOSE 10 G/15ML
600 SOLUTION ORAL; RECTAL
Qty: 1800 | Refills: 11 | Status: ACTIVE
Start: 2023-09-05

## 2023-09-21 ENCOUNTER — APPOINTMENT (OUTPATIENT)
Dept: VASCULAR SURGERY | Facility: HOSPITAL | Age: 38
End: 2023-09-21
Payer: MEDICAID

## 2023-09-21 ENCOUNTER — TRANSCRIBE ORDERS (OUTPATIENT)
Dept: ADMINISTRATIVE | Facility: HOSPITAL | Age: 38
End: 2023-09-21

## 2023-09-21 ENCOUNTER — HOSPITAL ENCOUNTER (OUTPATIENT)
Dept: CARDIOLOGY | Facility: HOSPITAL | Age: 38
Discharge: HOME | End: 2023-09-21
Payer: MEDICAID

## 2023-09-21 DIAGNOSIS — Z99.2 ENCOUNTER FOR EXTRACORPOREAL DIALYSIS: ICD-10-CM

## 2023-09-21 DIAGNOSIS — N18.6 END STAGE RENAL DISEASE: Primary | ICD-10-CM

## 2023-09-21 DIAGNOSIS — N18.6 END STAGE RENAL DISEASE: ICD-10-CM

## 2023-09-21 LAB
BH CV VAS DIALYSIS ARTERIAL ANASTOMOSIS DIAMETER: 0.61 CM
BH CV VAS DIALYSIS ARTERIAL ANASTOMOSIS EDV: 250 CM/SEC
BH CV VAS DIALYSIS ARTERIAL ANASTOMOSIS PSV: 517 CM/SEC
BH CV VAS DIALYSIS CONDUIT DIST DEPTH: 0.17 CM
BH CV VAS DIALYSIS CONDUIT DIST DIAMETER: 1.81 CM
BH CV VAS DIALYSIS CONDUIT DIST EDV: 13 CM/SEC
BH CV VAS DIALYSIS CONDUIT DIST PSV: 33 CM/SEC
BH CV VAS DIALYSIS CONDUIT MID DEPTH: 0.82 CM
BH CV VAS DIALYSIS CONDUIT MID DIAMETER: 1.37 CM
BH CV VAS DIALYSIS CONDUIT MID EDV: 50 CM/SEC
BH CV VAS DIALYSIS CONDUIT MID FLOW VOL: 1758 ML/MIN
BH CV VAS DIALYSIS CONDUIT MID PSV: 118 CM/SEC
BH CV VAS DIALYSIS CONDUIT MID/DIST DEPTH: 0.21 CM
BH CV VAS DIALYSIS CONDUIT MID/DIST DIAMETER: 2.15 CM
BH CV VAS DIALYSIS CONDUIT MID/DIST EDV: 23 CM/SEC
BH CV VAS DIALYSIS CONDUIT MID/DIST PSV: 57 CM/SEC
BH CV VAS DIALYSIS CONDUIT PROX DEPTH: 0.2 CM
BH CV VAS DIALYSIS CONDUIT PROX DIAMETER: 0.62 CM
BH CV VAS DIALYSIS CONDUIT PROX EDV: 204 CM/SEC
BH CV VAS DIALYSIS CONDUIT PROX FLOW VOL: 1362 ML/MIN
BH CV VAS DIALYSIS CONDUIT PROX PSV: 448 CM/SEC
BH CV VAS DIALYSIS CONDUIT PROX/MID DEPTH: 0.14 CM
BH CV VAS DIALYSIS CONDUIT PROX/MID DIAMETER: 2.79 CM
BH CV VAS DIALYSIS CONDUIT PROX/MID EDV: 46 CM/SEC
BH CV VAS DIALYSIS CONDUIT PROX/MID PSV: 100 CM/SEC
BH CV VAS DIALYSIS PRE-INFLOW BRACHIAL DIAMETER: 0.83 CM
BH CV VAS DIALYSIS PRE-INFLOW BRACHIAL EDV: 46 CM/SEC
BH CV VAS DIALYSIS PRE-INFLOW BRACHIAL FLOW VOL: 1253 ML/MIN
BH CV VAS DIALYSIS PRE-INFLOW BRACHIAL PSV: 114 CM/SEC
BH CV VAS DIALYSIS VENOUS OUTFLOW CEPHALIC ARCH DIAMETE: 0.63 CM
BH CV VAS DIALYSIS VENOUS OUTFLOW CEPHALIC ARCH EDV: 15 CM/SEC
BH CV VAS DIALYSIS VENOUS OUTFLOW CEPHALIC ARCH PSV: 104 CM/SEC
BH CV VAS DIALYSIS VENOUS OUTFLOW SUBCLAVIAN DIAMETER: 1.25 CM
BH CV VAS DIALYSIS VENOUS OUTFLOW SUBCLAVIAN EDV: 55 CM/SEC
BH CV VAS DIALYSIS VENOUS OUTFLOW SUBCLAVIAN PSV: 182 CM/SEC

## 2023-09-21 PROCEDURE — G0463 HOSPITAL OUTPT CLINIC VISIT: HCPCS

## 2023-09-21 PROCEDURE — 93990 DOPPLER FLOW TESTING: CPT

## 2024-01-04 ENCOUNTER — HOSPITAL ENCOUNTER (OUTPATIENT)
Dept: INTERVENTIONAL RADIOLOGY/VASCULAR | Facility: HOSPITAL | Age: 39
Discharge: HOME | End: 2024-01-04
Payer: MEDICAID

## 2024-01-04 VITALS
DIASTOLIC BLOOD PRESSURE: 73 MMHG | HEART RATE: 62 BPM | SYSTOLIC BLOOD PRESSURE: 134 MMHG | RESPIRATION RATE: 17 BRPM | OXYGEN SATURATION: 96 %

## 2024-01-04 DIAGNOSIS — Z99.2 ESRD (END STAGE RENAL DISEASE) ON DIALYSIS: ICD-10-CM

## 2024-01-04 DIAGNOSIS — N18.6 ESRD (END STAGE RENAL DISEASE) ON DIALYSIS: ICD-10-CM

## 2024-01-04 PROCEDURE — C1894 INTRO/SHEATH, NON-LASER: HCPCS

## 2024-01-04 PROCEDURE — 25010000002 LIDOCAINE 1 % SOLUTION: Performed by: RADIOLOGY

## 2024-01-04 RX ORDER — LIDOCAINE HYDROCHLORIDE 10 MG/ML
INJECTION, SOLUTION INFILTRATION; PERINEURAL AS NEEDED
Status: COMPLETED | OUTPATIENT
Start: 2024-01-04 | End: 2024-01-04

## 2024-01-04 RX ADMIN — LIDOCAINE HYDROCHLORIDE 3 ML: 10 INJECTION, SOLUTION INFILTRATION; PERINEURAL at 13:55

## 2024-04-15 NOTE — PROGRESS NOTES
[facility] VASCULAR SURGERY    Chief Complaint  Follow-up (6 month f/u) and Hemodialysis Access    Subjective          History of Present Illness  Geno Crouch is a 38 y.o. female with ESRD. She presents to the office today for follow up of her AV fistula. She has had the following vascular interventions performed:    Left arm brachiobasilic AVF creation by Dr. Hernandez on 7/6/2020  Transposition of previous created basilic vein fistula with new anastamosis by Dr. Douglas on 11/13/2020  Left brachiobasilic AV fistulogram with anastomotic balloon angioplasty to 5 mm and juxta anastomotic balloon angioplasty to 8 mm by Dr. Basurto on 2/12/2021    She denies any hospitalizations or new diagnosis since we last saw her. She denies any symptoms of vascular steal syndrome.  She dialyzes on a MWF schedule. She denies any issues with dialysis. She reports compliance with her medications. She is a not a smoker.     Review of Systems   Musculoskeletal:  Negative for myalgias.   Skin:  Negative for color change, pallor and wound.   Neurological:  Negative for numbness.        Allergies: Patient has no known allergies.    Prior to Admission medications    Medication Sig Start Date End Date Taking? Authorizing Provider   bumetanide (BUMEX) 2 MG tablet Take 2 mg by mouth Daily. DO NOT TAKE PREOP    Wes Luther MD   cloNIDine (CATAPRES) 0.3 MG tablet Take 0.3 mg by mouth 2 (Two) Times a Day. TAKE PREOP    Wes Luther MD   furosemide (LASIX) 20 MG tablet Take 1 tablet by mouth Daily. DONT TAKE PREOP    Wes Luther MD   hydrALAZINE (APRESOLINE) 100 MG tablet Take 100 mg by mouth 3 (Three) Times a Day. Take preop    Wes Luther MD   HYDROcodone-acetaminophen (Norco) 5-325 MG per tablet Take 1 tablet by mouth Every 8 (Eight) Hours As Needed for Severe Pain . 11/13/20   Arnel Douglas MD   insulin aspart prot-insulin aspart (novoLOG 70/30) (70-30) 100 UNIT/ML injection  "Inject 17 Units under the skin into the appropriate area as directed 2 (Two) Times a Day With Meals. DO NOT TAKE PREOP    Wes Luther MD   labetalol (NORMODYNE) 300 MG tablet Take 300 mg by mouth 2 (Two) Times a Day. Take preop    Wes Luther MD   minoxidil (LONITEN) 2.5 MG tablet Take 2.5 mg by mouth 2 (Two) Times a Day. TAKE PREOP    ProviderWes MD         Objective   Vital Signs:  /84 (BP Location: Right arm, Patient Position: Sitting)   Pulse 110   Ht 157.5 cm (62\")   Wt 63.5 kg (140 lb)   BMI 25.61 kg/m²   Estimated body mass index is 25.61 kg/m² as calculated from the following:    Height as of this encounter: 157.5 cm (62\").    Weight as of this encounter: 63.5 kg (140 lb).       Physical Exam  Vitals reviewed.   Constitutional:       General: She is not in acute distress.     Appearance: She is not ill-appearing.   Cardiovascular:      Rate and Rhythm: Normal rate.      Comments: Very large left arm AVF with good thrill and bruit present  Pulmonary:      Effort: Pulmonary effort is normal. No respiratory distress.   Skin:     General: Skin is warm and dry.   Neurological:      General: No focal deficit present.      Mental Status: She is alert and oriented to person, place, and time.      GCS: GCS eye subscore is 4. GCS verbal subscore is 5. GCS motor subscore is 6.        Result Review :    The following data was reviewed by: VIC Hancock on 04/16/2024:  Duplex hemodialysis access CAR (04/16/2024 14:08)    IR peripheral av dialysis catheter placement (01/04/2024 13:57)            Assessment and Plan     Diagnoses and all orders for this visit:    1. AV (arteriovenous fistula) (Primary)  -     Duplex Hemodialysis Access CAR; Future    2. ESRD (end stage renal disease)  -     Duplex Hemodialysis Access CAR; Future      Geno Crouch is a 38 y.o. female with ESRD. She denies any issues with dialysis. She denies any symptoms of steal syndrome. There is a " thrill and bruit present in the AV fistula. Duplex shows AV fistula is of good depth and diameter, adequate flow volumes. We will plan to see the patient back in the office in 6 months with a duplex of her fistula. She was instructed to call our office if she had any questions or concerns.            Follow Up     Return in about 6 months (around 10/16/2024) for fistula duplex.    Patient was given instructions and counseling regarding her condition or for health maintenance advice. Please see specific information pulled into the AVS if appropriate.     Lakesha Forman, APRN

## 2024-04-16 ENCOUNTER — HOSPITAL ENCOUNTER (OUTPATIENT)
Dept: CARDIOLOGY | Facility: HOSPITAL | Age: 39
Discharge: HOME | End: 2024-04-16
Admitting: NURSE PRACTITIONER
Payer: MEDICAID

## 2024-04-16 ENCOUNTER — OFFICE VISIT (OUTPATIENT)
Age: 39
End: 2024-04-16
Payer: MEDICAID

## 2024-04-16 VITALS
SYSTOLIC BLOOD PRESSURE: 164 MMHG | HEART RATE: 110 BPM | WEIGHT: 140 LBS | DIASTOLIC BLOOD PRESSURE: 84 MMHG | HEIGHT: 62 IN | BODY MASS INDEX: 25.76 KG/M2

## 2024-04-16 DIAGNOSIS — N18.6 ESRD (END STAGE RENAL DISEASE): Chronic | ICD-10-CM

## 2024-04-16 DIAGNOSIS — Z99.2 ENCOUNTER FOR EXTRACORPOREAL DIALYSIS: ICD-10-CM

## 2024-04-16 DIAGNOSIS — N18.6 END STAGE RENAL DISEASE: ICD-10-CM

## 2024-04-16 DIAGNOSIS — I77.0 AV (ARTERIOVENOUS FISTULA): Primary | Chronic | ICD-10-CM

## 2024-04-16 LAB
BH CV VAS DIALYSIS ARTERIAL ANASTOMOSIS DIAMETER: 0.62 CM
BH CV VAS DIALYSIS ARTERIAL ANASTOMOSIS EDV: 155 CM/SEC
BH CV VAS DIALYSIS ARTERIAL ANASTOMOSIS PSV: 343 CM/SEC
BH CV VAS DIALYSIS CONDUIT DIST DEPTH: 0.11 CM
BH CV VAS DIALYSIS CONDUIT DIST DIAMETER: 1.7 CM
BH CV VAS DIALYSIS CONDUIT DIST EDV: 14 CM/SEC
BH CV VAS DIALYSIS CONDUIT DIST FLOW VOL: 1042 ML/MIN
BH CV VAS DIALYSIS CONDUIT DIST PSV: 26 CM/SEC
BH CV VAS DIALYSIS CONDUIT MID DEPTH: 0.14 CM
BH CV VAS DIALYSIS CONDUIT MID DIAMETER: 1.91 CM
BH CV VAS DIALYSIS CONDUIT MID EDV: 17 CM/SEC
BH CV VAS DIALYSIS CONDUIT MID FLOW VOL: 1708 ML/MIN
BH CV VAS DIALYSIS CONDUIT MID PSV: 38 CM/SEC
BH CV VAS DIALYSIS CONDUIT PROX DEPTH: 0.07 CM
BH CV VAS DIALYSIS CONDUIT PROX DIAMETER: 2.9 CM
BH CV VAS DIALYSIS CONDUIT PROX EDV: 70 CM/SEC
BH CV VAS DIALYSIS CONDUIT PROX FLOW VOL: NORMAL ML/MIN
BH CV VAS DIALYSIS CONDUIT PROX PSV: 132 CM/SEC
BH CV VAS DIALYSIS CONDUIT PROX/MID DEPTH: 0.29 CM
BH CV VAS DIALYSIS CONDUIT PROX/MID DIAMETER: 1.89 CM
BH CV VAS DIALYSIS CONDUIT PROX/MID EDV: 57 CM/SEC
BH CV VAS DIALYSIS CONDUIT PROX/MID FLOW VOL: 9233 ML/MIN
BH CV VAS DIALYSIS CONDUIT PROX/MID PSV: 103 CM/SEC
BH CV VAS DIALYSIS PRE-INFLOW BRACHIAL DIAMETER: 0.95 CM
BH CV VAS DIALYSIS PRE-INFLOW BRACHIAL EDV: 51 CM/SEC
BH CV VAS DIALYSIS PRE-INFLOW BRACHIAL FLOW VOL: 1340 ML/MIN
BH CV VAS DIALYSIS PRE-INFLOW BRACHIAL PSV: 155 CM/SEC
BH CV VAS DIALYSIS VENOUS OUTFLOW AXILLARY DIAMETER: 0.49 CM
BH CV VAS DIALYSIS VENOUS OUTFLOW AXILLARY EDV: 3 CM/SEC
BH CV VAS DIALYSIS VENOUS OUTFLOW AXILLARY PSV: 34 CM/SEC

## 2024-04-16 PROCEDURE — 93990 DOPPLER FLOW TESTING: CPT

## (undated) DEVICE — SUT VIC 2/0 CT1 36IN

## (undated) DEVICE — SUT PROLN 3/0 SH D/A 36IN 8522H

## (undated) DEVICE — SUT SILK 3/0 SH CR8 18IN C013D

## (undated) DEVICE — SOL NS 500ML

## (undated) DEVICE — CUFF SCD HEMOFORCE SEQ CALF STD MD

## (undated) DEVICE — COVER,TABLE,44X90,STERILE: Brand: MEDLINE

## (undated) DEVICE — PTA BALLOON DILATATION CATHETER: Brand: MUSTANG™

## (undated) DEVICE — MICRO TIP WIPE: Brand: DEVON

## (undated) DEVICE — STAPLER, SKIN, 35W, A: Brand: MEDLINE INDUSTRIES, INC.

## (undated) DEVICE — GAUZE,SPONGE,4"X4",32PLY,XRAY,STRL,LF: Brand: MEDLINE

## (undated) DEVICE — GOWN,REINFRCE,POLY,SIRUS,BREATH SLV,XXLG: Brand: MEDLINE

## (undated) DEVICE — SKIN PREP TRAY W/CHG: Brand: MEDLINE INDUSTRIES, INC.

## (undated) DEVICE — SUT PROLN 6/0 BV1 D/A 30IN 8709H

## (undated) DEVICE — SPNG LAP PREWSH SFTPK 18X18IN STRL PK/5

## (undated) DEVICE — GLV SURG BIOGEL LTX PF 8 1/2

## (undated) DEVICE — SI AVANTI + 5F BRACHIAL: Brand: AVANTI

## (undated) DEVICE — PK MINOR VASC 50

## (undated) DEVICE — SOL IRRIG NACL 9PCT 1000ML BTL

## (undated) DEVICE — MARKR SKIN 2TP W/RULR

## (undated) DEVICE — VI-DRAPE ISOLATION BAG: Brand: CONVERTORS

## (undated) DEVICE — ST ACC MICROPUNCTURE STFF/CANN PLAT/TP 4F 21G 40CM

## (undated) DEVICE — RADIFOCUS GLIDEWIRE: Brand: GLIDEWIRE

## (undated) DEVICE — SOL IRRIG H2O 1000ML STRL

## (undated) DEVICE — UNDYED BRAIDED (POLYGLACTIN 910), SYNTHETIC ABSORBABLE SUTURE: Brand: COATED VICRYL

## (undated) DEVICE — LN INJ CONTRST FLXCIL HP F/M LL 1200PSI72

## (undated) DEVICE — KT SURG TURNOVER 050

## (undated) DEVICE — GLV SURG DERMASSURE GRN LF PF 8.5

## (undated) DEVICE — DEV INFL COMPAK W/ACCESSPLUS IN4530

## (undated) DEVICE — MAJOR VASCULAR PACK-LF: Brand: MEDLINE INDUSTRIES, INC.

## (undated) DEVICE — TBG PRESS/MONITOR FIX M/F LL A/ 24IN STRL

## (undated) DEVICE — RADIFOCUS GLIDECATH: Brand: GLIDECATH

## (undated) DEVICE — SKIN AFFIX SURG ADHESIVE 72/CS 0.55ML: Brand: MEDLINE

## (undated) DEVICE — DRAPE,HAND,STERILE: Brand: MEDLINE

## (undated) DEVICE — SI BRITE TIP SHEATH F6 5.5CM: Brand: BRITE TIP